# Patient Record
Sex: FEMALE | Race: BLACK OR AFRICAN AMERICAN | Employment: FULL TIME
[De-identification: names, ages, dates, MRNs, and addresses within clinical notes are randomized per-mention and may not be internally consistent; named-entity substitution may affect disease eponyms.]

---

## 2017-01-17 ENCOUNTER — TELEPHONE (OUTPATIENT)
Dept: PULMONOLOGY | Facility: CLINIC | Age: 62
End: 2017-01-17

## 2017-03-07 ENCOUNTER — OFFICE VISIT (OUTPATIENT)
Dept: PULMONOLOGY | Facility: CLINIC | Age: 62
End: 2017-03-07

## 2017-03-07 VITALS
BODY MASS INDEX: 35.93 KG/M2 | DIASTOLIC BLOOD PRESSURE: 89 MMHG | HEART RATE: 64 BPM | WEIGHT: 242.6 LBS | SYSTOLIC BLOOD PRESSURE: 150 MMHG | HEIGHT: 69 IN | RESPIRATION RATE: 16 BRPM | OXYGEN SATURATION: 100 %

## 2017-03-07 DIAGNOSIS — G47.33 OSA (OBSTRUCTIVE SLEEP APNEA): ICD-10-CM

## 2017-03-07 DIAGNOSIS — J45.40 MODERATE PERSISTENT ASTHMA WITHOUT COMPLICATION: ICD-10-CM

## 2017-03-07 DIAGNOSIS — R94.2 ABNORMAL PFTS: ICD-10-CM

## 2017-03-07 DIAGNOSIS — J30.9 ALLERGIC RHINITIS, UNSPECIFIED ALLERGIC RHINITIS TRIGGER, UNSPECIFIED RHINITIS SEASONALITY: ICD-10-CM

## 2017-03-07 DIAGNOSIS — J84.2 LYMPHOCYTIC INTERSTITIAL PNEUMONIA (HCC): Primary | ICD-10-CM

## 2017-03-07 PROCEDURE — 99214 OFFICE O/P EST MOD 30 MIN: CPT | Performed by: INTERNAL MEDICINE

## 2017-03-07 RX ORDER — AMLODIPINE BESYLATE 10 MG/1
10 TABLET ORAL DAILY
COMMUNITY

## 2017-03-07 RX ORDER — ESTRADIOL 0.5 MG/1
0.5 TABLET ORAL WEEKLY
COMMUNITY
End: 2019-06-04

## 2017-03-07 ASSESSMENT — SLEEP AND FATIGUE QUESTIONNAIRES
ESS TOTAL SCORE: 1
HOW LIKELY ARE YOU TO NOD OFF OR FALL ASLEEP WHILE SITTING QUIETLY AFTER LUNCH WITHOUT ALCOHOL: 0
HOW LIKELY ARE YOU TO NOD OFF OR FALL ASLEEP WHEN YOU ARE A PASSENGER IN A CAR FOR AN HOUR WITHOUT A BREAK: 0
HOW LIKELY ARE YOU TO NOD OFF OR FALL ASLEEP WHILE SITTING INACTIVE IN A PUBLIC PLACE: 0
HOW LIKELY ARE YOU TO NOD OFF OR FALL ASLEEP WHILE SITTING AND TALKING TO SOMEONE: 0
HOW LIKELY ARE YOU TO NOD OFF OR FALL ASLEEP WHILE WATCHING TV: 1
HOW LIKELY ARE YOU TO NOD OFF OR FALL ASLEEP WHILE SITTING AND READING: 0
HOW LIKELY ARE YOU TO NOD OFF OR FALL ASLEEP WHILE LYING DOWN TO REST IN THE AFTERNOON WHEN CIRCUMSTANCES PERMIT: 0
HOW LIKELY ARE YOU TO NOD OFF OR FALL ASLEEP IN A CAR, WHILE STOPPED FOR A FEW MINUTES IN TRAFFIC: 0

## 2017-04-25 ENCOUNTER — HOSPITAL ENCOUNTER (OUTPATIENT)
Dept: MAMMOGRAPHY | Age: 62
Discharge: HOME | End: 2017-04-25
Payer: COMMERCIAL

## 2017-04-25 DIAGNOSIS — Z12.31 VISIT FOR SCREENING MAMMOGRAM: ICD-10-CM

## 2017-04-25 PROCEDURE — 77063 BREAST TOMOSYNTHESIS BI: CPT

## 2017-09-12 ENCOUNTER — OFFICE VISIT (OUTPATIENT)
Dept: PULMONOLOGY | Age: 62
End: 2017-09-12
Payer: COMMERCIAL

## 2017-09-12 VITALS — HEIGHT: 69 IN | OXYGEN SATURATION: 99 % | BODY MASS INDEX: 36.14 KG/M2 | HEART RATE: 73 BPM | WEIGHT: 244 LBS

## 2017-09-12 VITALS
DIASTOLIC BLOOD PRESSURE: 93 MMHG | HEART RATE: 70 BPM | OXYGEN SATURATION: 99 % | HEIGHT: 69 IN | RESPIRATION RATE: 14 BRPM | TEMPERATURE: 96.1 F | WEIGHT: 244 LBS | SYSTOLIC BLOOD PRESSURE: 150 MMHG | BODY MASS INDEX: 36.14 KG/M2

## 2017-09-12 DIAGNOSIS — G47.33 OSA (OBSTRUCTIVE SLEEP APNEA): ICD-10-CM

## 2017-09-12 DIAGNOSIS — J84.2 LYMPHOCYTIC INTERSTITIAL PNEUMONIA (HCC): Primary | ICD-10-CM

## 2017-09-12 DIAGNOSIS — R94.2 ABNORMAL PFT: ICD-10-CM

## 2017-09-12 DIAGNOSIS — J45.40 MODERATE PERSISTENT ASTHMA WITHOUT COMPLICATION: ICD-10-CM

## 2017-09-12 DIAGNOSIS — M35.9 CONNECTIVE TISSUE DISEASE, UNDIFFERENTIATED (HCC): ICD-10-CM

## 2017-09-12 DIAGNOSIS — M05.20 RHEUMATOID ARTERITIS (HCC): ICD-10-CM

## 2017-09-12 DIAGNOSIS — J30.89 NON-SEASONAL ALLERGIC RHINITIS, UNSPECIFIED ALLERGIC RHINITIS TRIGGER: ICD-10-CM

## 2017-09-12 DIAGNOSIS — J45.40 MODERATE PERSISTENT ASTHMA WITHOUT COMPLICATION: Primary | ICD-10-CM

## 2017-09-12 DIAGNOSIS — M34.9 SCLERODERMA (HCC): ICD-10-CM

## 2017-09-12 PROCEDURE — 94060 EVALUATION OF WHEEZING: CPT | Performed by: INTERNAL MEDICINE

## 2017-09-12 PROCEDURE — 94729 DIFFUSING CAPACITY: CPT | Performed by: INTERNAL MEDICINE

## 2017-09-12 PROCEDURE — 99215 OFFICE O/P EST HI 40 MIN: CPT | Performed by: INTERNAL MEDICINE

## 2017-09-12 PROCEDURE — 94726 PLETHYSMOGRAPHY LUNG VOLUMES: CPT | Performed by: INTERNAL MEDICINE

## 2017-09-12 ASSESSMENT — SLEEP AND FATIGUE QUESTIONNAIRES
HOW LIKELY ARE YOU TO NOD OFF OR FALL ASLEEP WHILE SITTING QUIETLY AFTER LUNCH WITHOUT ALCOHOL: 0
HOW LIKELY ARE YOU TO NOD OFF OR FALL ASLEEP IN A CAR, WHILE STOPPED FOR A FEW MINUTES IN TRAFFIC: 0
HOW LIKELY ARE YOU TO NOD OFF OR FALL ASLEEP WHILE LYING DOWN TO REST IN THE AFTERNOON WHEN CIRCUMSTANCES PERMIT: 1
HOW LIKELY ARE YOU TO NOD OFF OR FALL ASLEEP WHEN YOU ARE A PASSENGER IN A CAR FOR AN HOUR WITHOUT A BREAK: 0
HOW LIKELY ARE YOU TO NOD OFF OR FALL ASLEEP WHILE SITTING AND TALKING TO SOMEONE: 0
ESS TOTAL SCORE: 1
HOW LIKELY ARE YOU TO NOD OFF OR FALL ASLEEP WHILE SITTING AND READING: 0
HOW LIKELY ARE YOU TO NOD OFF OR FALL ASLEEP WHILE WATCHING TV: 0
HOW LIKELY ARE YOU TO NOD OFF OR FALL ASLEEP WHILE SITTING INACTIVE IN A PUBLIC PLACE: 0

## 2017-10-11 ENCOUNTER — HOSPITAL ENCOUNTER (OUTPATIENT)
Dept: NON INVASIVE DIAGNOSTICS | Age: 62
Discharge: HOME | End: 2017-10-11
Payer: COMMERCIAL

## 2017-10-11 ENCOUNTER — HOSPITAL ENCOUNTER (OUTPATIENT)
Dept: CT IMAGING | Age: 62
Discharge: HOME | End: 2017-10-11
Payer: COMMERCIAL

## 2017-10-11 DIAGNOSIS — J84.2 LYMPHOCYTIC INTERSTITIAL PNEUMONIA (HCC): ICD-10-CM

## 2017-10-11 DIAGNOSIS — R94.2 ABNORMAL PFT: ICD-10-CM

## 2017-10-11 LAB
LV EF: 55 %
LVEF MODALITY: NORMAL

## 2017-10-11 PROCEDURE — 71250 CT THORAX DX C-: CPT

## 2017-10-11 PROCEDURE — 93306 TTE W/DOPPLER COMPLETE: CPT

## 2017-10-24 ENCOUNTER — HOSPITAL ENCOUNTER (OUTPATIENT)
Dept: ULTRASOUND IMAGING | Age: 62
Discharge: HOME | End: 2017-10-24
Payer: COMMERCIAL

## 2017-10-24 ENCOUNTER — HOSPITAL ENCOUNTER (OUTPATIENT)
Dept: MAMMOGRAPHY | Age: 62
Discharge: HOME | End: 2017-10-24
Payer: COMMERCIAL

## 2017-10-24 DIAGNOSIS — R92.8 ABNORMAL MAMMOGRAM: ICD-10-CM

## 2017-10-24 DIAGNOSIS — N63.0 LUMP OR MASS IN BREAST: ICD-10-CM

## 2017-10-24 PROCEDURE — G0279 TOMOSYNTHESIS, MAMMO: HCPCS

## 2017-10-24 PROCEDURE — 76642 ULTRASOUND BREAST LIMITED: CPT

## 2017-12-12 ENCOUNTER — OFFICE VISIT (OUTPATIENT)
Dept: PULMONOLOGY | Age: 62
End: 2017-12-12
Payer: COMMERCIAL

## 2017-12-12 ENCOUNTER — TELEPHONE (OUTPATIENT)
Dept: PULMONOLOGY | Age: 62
End: 2017-12-12

## 2017-12-12 VITALS
RESPIRATION RATE: 12 BRPM | SYSTOLIC BLOOD PRESSURE: 134 MMHG | TEMPERATURE: 97 F | WEIGHT: 239.8 LBS | DIASTOLIC BLOOD PRESSURE: 89 MMHG | OXYGEN SATURATION: 100 % | HEIGHT: 68 IN | HEART RATE: 76 BPM | BODY MASS INDEX: 36.34 KG/M2

## 2017-12-12 DIAGNOSIS — M35.9 CONNECTIVE TISSUE DISEASE, UNDIFFERENTIATED (HCC): ICD-10-CM

## 2017-12-12 DIAGNOSIS — R91.8 LUNG NODULES: ICD-10-CM

## 2017-12-12 DIAGNOSIS — J45.40 MODERATE PERSISTENT ASTHMA WITHOUT COMPLICATION: ICD-10-CM

## 2017-12-12 DIAGNOSIS — J84.2 LYMPHOCYTIC INTERSTITIAL PNEUMONIA (HCC): Primary | ICD-10-CM

## 2017-12-12 DIAGNOSIS — G47.33 OSA (OBSTRUCTIVE SLEEP APNEA): ICD-10-CM

## 2017-12-12 DIAGNOSIS — R94.2 ABNORMAL PFTS: ICD-10-CM

## 2017-12-12 PROCEDURE — 99215 OFFICE O/P EST HI 40 MIN: CPT | Performed by: INTERNAL MEDICINE

## 2017-12-12 RX ORDER — PREDNISONE 1 MG/1
1 TABLET ORAL DAILY
COMMUNITY

## 2017-12-12 NOTE — PROGRESS NOTES
denies symptoms of EDS, and daytime naps and unrefresh sleep    Pt has not been hospitalized or been to er since last visit. Has been using meds as recommended. Subjective:   Review of Systems -   General ROS: negative for - chills, fever, hot flashes, night sweats, weight gain or weight loss  ENT ROS: negative for - epistaxis, nasal congestion, nasal polyps, oral lesions or sneezing  Allergy and Immunology ROS: negative for - nasal congestion, postnasal drip or seasonal allergies  Respiratory ROS: negative for - cough, hemoptysis, orthopnea, sputum changes, stridor, tachypnea or wheezing  Cardiovascular ROS: negative for - chest pain, dyspnea on exertion, edema, loss of consciousness, orthopnea or paroxysmal nocturnal dyspnea  Gastrointestinal ROS: no abdominal pain, change in bowel habits, or black or bloody stools  Musculoskeletal ROS: negative   Genitourinary: Negative for hematuria or dysuria frequency and nocturia. CNS negative for dizziness vertigo weakness numbness tingling. Skin negative for rash and a skin lesion. Hematology oncology negative for easy bleeding and bruising    Sleep Medicine 9/12/2017 3/7/2017   Sitting and reading 0 0   Watching TV 0 1   Sitting, inactive in a public place (e.g. a theatre or a meeting) 0 0   As a passenger in a car for an hour without a break 0 0   Lying down to rest in the afternoon when circumstances permit 1 0   Sitting and talking to someone 0 0   Sitting quietly after a lunch without alcohol 0 0   In a car, while stopped for a few minutes in traffic 0 0   Total score 1 1       Allergies:   Allergies   Allergen Reactions    Nubain [Nalbuphine Hcl]     Penicillins        Medications:  Outpatient Encounter Prescriptions as of 12/12/2017   Medication Sig Dispense Refill    predniSONE (DELTASONE) 1 MG tablet Take 1 mg by mouth daily      fluticasone-salmeterol (ADVAIR DISKUS) 250-50 MCG/DOSE AEPB Inhale 1 puff into the lungs 2 times daily 1 Inhaler 11    estradiol (ESTRACE) 0.5 MG tablet Take 0.5 mg by mouth once a week      amLODIPine (NORVASC) 10 MG tablet Take 10 mg by mouth daily      [DISCONTINUED] fluticasone-salmeterol (ADVAIR) 500-50 MCG/DOSE diskus inhaler Inhale 1 puff into the lungs every 12 hours      nebivolol (BYSTOLIC) 10 MG tablet Take  by mouth daily.  Tramadol-Acetaminophen (ULTRACET PO) Take  by mouth daily as needed. No facility-administered encounter medications on file as of 12/12/2017. Objective:    Physical Exam:  Vitals:   /89 (Site: Right Arm, Position: Sitting)   Pulse 76   Temp 97 °F (36.1 °C)   Resp 12   Ht 5' 8\" (1.727 m)   Wt 239 lb 12.8 oz (108.8 kg)   SpO2 100% Comment: Room air  BMI 36.46 kg/m²   Last 3 weights: Wt Readings from Last 3 Encounters:   12/12/17 239 lb 12.8 oz (108.8 kg)   09/12/17 244 lb (110.7 kg)   09/12/17 244 lb (110.7 kg)     Body mass index is 36.46 kg/m².     Physical Examination:   General appearance - alert, well appearing, and in no distress  Mental status - alert, oriented to person, place, and time  Eyes - pupils equal and reactive, extraocular eye movements intact  Ears - right ear normal, left ear normal  Nose - normal and patent, no erythema, discharge or polyps  Mouth - mucous membranes moist, pharynx normal without lesions   Neck - supple, no significant adenopathy, short and thick neck  Chest - clear to auscultation, no wheezes, rales or rhonchi, symmetric air entry  Heart - normal rate, regular rhythm, normal S1, S2, no murmurs, rubs, clicks or gallops  Abdomen - soft, nontender, nondistended, no masses or organomegaly  Neurological - alert, oriented, normal speech, no focal findings or movement disorder noted}  Extremities - peripheral pulses normal, no pedal edema, no clubbing or cyanosis  Skin - normal coloration and turgor, no rashes, no suspicious skin lesions noted     Labs:  None    CBC:   WBC   Date Value Ref Range Status   06/04/2015 10.0 3.5 - 11.0 k/uL 21. Stable pleural-based posterior left lower lobe nodule measuring 3-4 mm on image 32. Prior cholecystectomy. No significant osseous abnormality with mild hypertrophic    degenerative changes in thoracic spine. EKG:   ECHO:       Assessment:    ICD-10-CM ICD-9-CM    1. Lymphocytic interstitial pneumonia (HCC) J84.2 516.8 FULL PFT STUDY WITH PRE AND POST   2. Moderate persistent asthma without complication L70.69 566.74 FULL PFT STUDY WITH PRE AND POST   3. Abnormal PFTs R94.2 794.2    4. JOJO (obstructive sleep apnea) G47.33 327.23    5. Connective tissue disease, undifferentiated (HCC) M35.9 710.9    6. Lung nodules R91.8 793.19        PFT's reviewed from jan 2016 and sep 2017  and showeddecline in DLCO and TLC and FEV1 and FVC    HRCT Chest seen from October 11, 2017 and also from January 16, 2016 I did not she significant ground glass changes significant fibrotic changes, I did not see septal thickening significant bronchiectatic changes or traction bronchiectasis.   The lung nodules which were present before  is stable without much change, small area of left lung and mid lung to lower lung off inflammatory/ground glass/fibrotic change his pain is stable syringe and very 2016 so overall her vision CTs is stable and there was no worsening of the high-resolution CT finding to explain her decline in her last primary function test and will need follow-up on a function test in about 6 months since she is on prednisone now    Plan:    Follow up Rheumatology and follow up as her worsening PFTs related to ILD from CTD   Prednisone per Rheumatology  Records and office notes from Rheumatology  PFTs next visit  Decrease  Advair 250/50   Albuterol as needed  Vaccinations recommended and Up to date    Maintain an active lifestyle       Encourage CPAP at least 4 hrs qhs  Wt loss is recommended and discussed  Follow good sleep hygeine instructions  Questions answered pertaining to diagnosis and management explained importance of compliance with therapy     RTC in 6 months      Nita Velazquez MD             12/12/2017, 9:51 AM

## 2017-12-12 NOTE — TELEPHONE ENCOUNTER
CALLED DR. Cordelia Sandoval MD OFFICE AND SPOKE WITH EVER SHE WILL FAX OVER THE .S NOTES. Cordelia Sandoval MD.  RHEUMATOLOGY.    [phone] PHONE   [phone] [id number] [facility]  Joey Liriano, [facility]

## 2018-06-12 ENCOUNTER — OFFICE VISIT (OUTPATIENT)
Dept: PULMONOLOGY | Age: 63
End: 2018-06-12
Payer: COMMERCIAL

## 2018-06-12 VITALS
DIASTOLIC BLOOD PRESSURE: 87 MMHG | SYSTOLIC BLOOD PRESSURE: 134 MMHG | HEIGHT: 69 IN | WEIGHT: 247 LBS | TEMPERATURE: 97 F | OXYGEN SATURATION: 96 % | BODY MASS INDEX: 36.58 KG/M2 | RESPIRATION RATE: 16 BRPM | HEART RATE: 76 BPM

## 2018-06-12 VITALS — BODY MASS INDEX: 36.58 KG/M2 | HEART RATE: 53 BPM | HEIGHT: 69 IN | OXYGEN SATURATION: 97 % | WEIGHT: 247 LBS

## 2018-06-12 DIAGNOSIS — G47.33 OBSTRUCTIVE SLEEP APNEA: ICD-10-CM

## 2018-06-12 DIAGNOSIS — J45.40 MODERATE PERSISTENT ASTHMA WITHOUT COMPLICATION: Primary | ICD-10-CM

## 2018-06-12 DIAGNOSIS — M05.20 RHEUMATOID ARTERITIS (HCC): ICD-10-CM

## 2018-06-12 DIAGNOSIS — J84.2 LIP (LYMPHOID INTERSTITIAL PNEUMONITIS) (HCC): Primary | ICD-10-CM

## 2018-06-12 DIAGNOSIS — R94.2 ABNORMAL PFT: ICD-10-CM

## 2018-06-12 DIAGNOSIS — M35.9 CONNECTIVE TISSUE DISEASE, UNDIFFERENTIATED (HCC): ICD-10-CM

## 2018-06-12 DIAGNOSIS — J45.40 MODERATE PERSISTENT ASTHMA WITHOUT COMPLICATION: ICD-10-CM

## 2018-06-12 DIAGNOSIS — R91.1 LUNG NODULE: ICD-10-CM

## 2018-06-12 DIAGNOSIS — M34.9 SCLERODERMA (HCC): ICD-10-CM

## 2018-06-12 DIAGNOSIS — J30.9 CHRONIC ALLERGIC RHINITIS, UNSPECIFIED SEASONALITY, UNSPECIFIED TRIGGER: ICD-10-CM

## 2018-06-12 PROCEDURE — 94729 DIFFUSING CAPACITY: CPT | Performed by: INTERNAL MEDICINE

## 2018-06-12 PROCEDURE — 94726 PLETHYSMOGRAPHY LUNG VOLUMES: CPT | Performed by: INTERNAL MEDICINE

## 2018-06-12 PROCEDURE — 94375 RESPIRATORY FLOW VOLUME LOOP: CPT | Performed by: INTERNAL MEDICINE

## 2018-06-12 PROCEDURE — 99214 OFFICE O/P EST MOD 30 MIN: CPT | Performed by: INTERNAL MEDICINE

## 2018-06-12 RX ORDER — ALBUTEROL SULFATE 90 UG/1
2 AEROSOL, METERED RESPIRATORY (INHALATION) EVERY 6 HOURS PRN
Qty: 1 INHALER | Refills: 11 | Status: SHIPPED | OUTPATIENT
Start: 2018-06-12 | End: 2021-05-12 | Stop reason: SDUPTHER

## 2019-02-05 ENCOUNTER — OFFICE VISIT (OUTPATIENT)
Dept: PULMONOLOGY | Age: 64
End: 2019-02-05
Payer: COMMERCIAL

## 2019-02-05 VITALS
WEIGHT: 253 LBS | HEART RATE: 73 BPM | BODY MASS INDEX: 37.47 KG/M2 | RESPIRATION RATE: 18 BRPM | SYSTOLIC BLOOD PRESSURE: 146 MMHG | DIASTOLIC BLOOD PRESSURE: 90 MMHG | HEIGHT: 69 IN | OXYGEN SATURATION: 97 % | TEMPERATURE: 97.1 F

## 2019-02-05 DIAGNOSIS — J84.2 LYMPHOCYTIC INTERSTITIAL PNEUMONIA (HCC): ICD-10-CM

## 2019-02-05 DIAGNOSIS — G47.33 OBSTRUCTIVE SLEEP APNEA: ICD-10-CM

## 2019-02-05 DIAGNOSIS — R91.1 LUNG NODULE: ICD-10-CM

## 2019-02-05 DIAGNOSIS — J45.40 MODERATE PERSISTENT ASTHMA WITHOUT COMPLICATION: Primary | ICD-10-CM

## 2019-02-05 PROCEDURE — 99214 OFFICE O/P EST MOD 30 MIN: CPT | Performed by: INTERNAL MEDICINE

## 2019-06-04 ENCOUNTER — OFFICE VISIT (OUTPATIENT)
Dept: PULMONOLOGY | Age: 64
End: 2019-06-04
Payer: COMMERCIAL

## 2019-06-04 VITALS
HEART RATE: 69 BPM | WEIGHT: 254.8 LBS | BODY MASS INDEX: 37.74 KG/M2 | SYSTOLIC BLOOD PRESSURE: 129 MMHG | RESPIRATION RATE: 12 BRPM | OXYGEN SATURATION: 99 % | DIASTOLIC BLOOD PRESSURE: 73 MMHG | HEIGHT: 69 IN

## 2019-06-04 DIAGNOSIS — G47.33 OBSTRUCTIVE SLEEP APNEA: ICD-10-CM

## 2019-06-04 DIAGNOSIS — R05.3 CHRONIC COUGH: ICD-10-CM

## 2019-06-04 DIAGNOSIS — J45.40 MODERATE PERSISTENT ASTHMA WITHOUT COMPLICATION: Primary | ICD-10-CM

## 2019-06-04 DIAGNOSIS — J30.9 ALLERGIC RHINITIS, UNSPECIFIED SEASONALITY, UNSPECIFIED TRIGGER: ICD-10-CM

## 2019-06-04 DIAGNOSIS — J84.2 LYMPHOCYTIC INTERSTITIAL PNEUMONIA (HCC): ICD-10-CM

## 2019-06-04 PROCEDURE — 99214 OFFICE O/P EST MOD 30 MIN: CPT | Performed by: INTERNAL MEDICINE

## 2019-06-04 NOTE — PROGRESS NOTES
PULMONARY OP  PROGRESS NOTE      Patient:  Praneeth Gusman  YOB: 1955    MRN: [id number]     Acct:        Pt seen and Chart reviewed. Mr/Ms Praneeth Gusman is here in followup for    Diagnosis Orders   1. Moderate persistent asthma without complication     2. Allergic rhinitis, unspecified seasonality, unspecified trigger     3. Lymphocytic interstitial pneumonia ([facility] [address]     4. Obstructive sleep apnea       She is here for follow-up of asthma, dyspnea and history of lymphocytic interstitial pneumonia. Since she was seen last time she was started on immnnothrapy Baricitinib on prednisone 1 mg daily by rheumatology Dr Nanette Lara for rheumatoid arthritis  She has history of noncompliance with CPAP and has not been using CPAP. She denies any increase in cough, she claimed that she has mild intermittent chronic cough no sputum production and no wheezing, denies daily persistent cough and denies nocturnal cough. She denies skin rash, she is complaining of swelling in right sternoclavicular joint for a few weeks without redness fever fluctuation. No change in voice, no hoarseness or difficulty swallowing and no stridor. She denies shortness of breath and she is able to do her regular activities without getting shortness of breath. He denies taking albuterol and use of albuterol is only occasionally. She is taking Advair 250/50 twice daily   She denies chronic postnasal dripping and nasal congestion or epistaxis, she is using Zyrtec and Flonase as needed. Her reflux symptoms are controlled on PPI. Since she was seen last time she had no history of exacerbation no ER visit or hospitalization for asthma. She has history of abnormal PFTs with a restrictive pattern and decrease DLCO for many years, she had a high-resolution CT of the chest done in 2017 which did not show areas of significant fibrosis or ground glass changes at that time.       Previous history/previous course  She has h/o Unspecified CTD/RA, ILD which was diagnosed to be LIP on open lung biopsy done many years ago and mild reduction in lung volumes and dlco and has stable PFTs and stable symptoms until last visit when there was decline in FEV1 and FVC, lung volumes and DLCO and she was scheduled  for HRCT Chest and ECHO, her high-resolution CT of the chest last time did not show active alveolitis, groundglass changes or significant fibrotic changes, her echo shows normal LV function but was difficult to estimated RV systolic pressure which shows normal RV size and function and she was instructed that she needs Rheumatology     In the past she was on prednisone then plaquenil and then Ariva and then she was stopped by her rheumatology and she was not seen by Dr Hien Chapin for some time. She has h/o JOJO diagnosed severe JOJO in 2005 but she is not using cpap for some times now and still non compliant with cpap and denies symptoms of EDS, and daytime naps and unrefresh sleep        Subjective:   Review of Systems -   General ROS: negative for - chills, fever, hot flashes, night sweats, weight gain or weight loss  ENT ROS: Negative for nasal congestion postnasal dripping, negative for - epistaxis, nasal congestion, nasal polyps, oral lesions or sneezing  Allergy and Immunology ROS: Positive for - nasal congestion, postnasal drip or seasonal allergies  Respiratory ROS: Negative for - cough, negative for hemoptysis, orthopnea, sputum changes, stridor, tachypnea or wheezing  Cardiovascular ROS: negative for - chest pain, dyspnea on exertion, edema, loss of consciousness, orthopnea or paroxysmal nocturnal dyspnea  Gastrointestinal ROS: no abdominal pain, change in bowel habits, or black or bloody stools  Musculoskeletal ROS: Positive for joints pain and stiffness, negative for joint swelling   Genitourinary: Negative for hematuria or dysuria frequency and nocturia.   CNS negative for dizziness vertigo weakness numbness tingling, syncope, headache, seizure. Skin negative for rash and a skin lesion. Hematology oncology negative for easy bleeding and bruising and petechia    Sleep Medicine 9/12/2017 3/7/2017   Sitting and reading 0 0   Watching TV 0 1   Sitting, inactive in a public place (e.g. a theatre or a meeting) 0 0   As a passenger in a car for an hour without a break 0 0   Lying down to rest in the afternoon when circumstances permit 1 0   Sitting and talking to someone 0 0   Sitting quietly after a lunch without alcohol 0 0   In a car, while stopped for a few minutes in traffic 0 0   Total score 1 1       Allergies: Allergies   Allergen Reactions    Nubain [Nalbuphine Hcl]     Penicillins        Medications:  Outpatient Encounter Medications as of 6/4/2019   Medication Sig Dispense Refill    Baricitinib (OLUMIANT) 2 MG TABS Take 2 mg by mouth daily      fluticasone-salmeterol (ADVAIR DISKUS) 250-50 MCG/DOSE AEPB Inhale 1 puff into the lungs 2 times daily 1 Inhaler 11    albuterol sulfate HFA (VENTOLIN HFA) 108 (90 Base) MCG/ACT inhaler Inhale 2 puffs into the lungs every 6 hours as needed for Wheezing 1 Inhaler 11    predniSONE (DELTASONE) 1 MG tablet Take 1 mg by mouth daily      amLODIPine (NORVASC) 10 MG tablet Take 10 mg by mouth daily      nebivolol (BYSTOLIC) 10 MG tablet Take  by mouth daily.  Tramadol-Acetaminophen (ULTRACET PO) Take  by mouth daily as needed.  [DISCONTINUED] estradiol (ESTRACE) 0.5 MG tablet Take 0.5 mg by mouth once a week       No facility-administered encounter medications on file as of 6/4/2019. Objective:    Physical Exam:  Vitals:   /73 (Site: Left Upper Arm, Position: Sitting, Cuff Size: Small Adult)   Pulse 69   Resp 12   Ht 5' 9\" (1.753 m)   Wt 254 lb 12.8 oz (115.6 kg)   SpO2 99% Comment: Room air at rest  Breastfeeding? No   BMI 37.63 kg/m²   Last 3 weights:    Wt Readings from Last 3 Encounters:   06/04/19 254 lb 12.8 oz (115.6 kg)   02/05/19 253 lb (114.8 kg)   06/12/18 247 lb (112 kg)     Body mass index is 37.63 kg/m².     Physical Examination:   General appearance - alert, well appearing, and in no distress  Mental status - alert, oriented to person, place, and time  Eyes - pupils equal and reactive, extraocular eye movements intact  Ears - right ear normal, left ear normal  Nose - normal and patent, no erythema, discharge or polyps  Mouth - mucous membranes moist, pharynx normal without lesions   Neck - supple, no significant adenopathy, short and thick neck  Chest - clear to auscultation, no wheezes, rales or rhonchi, symmetric air entry  Heart - normal rate, regular rhythm, normal S1, S2, no murmurs, rubs, clicks or gallops  Abdomen - soft, nontender, nondistended, no masses or organomegaly  Neurological - alert, oriented, normal speech, no focal findings or movement disorder noted  Extremities - peripheral pulses normal, no pedal edema, no clubbing or cyanosis  Skin - normal coloration and turgor, no rashes, no suspicious skin lesions noted     Labs:  None    CBC:   WBC   Date Value Ref Range Status   06/04/2015 10.0 3.5 - 11.0 k/uL Final     Hemoglobin   Date Value Ref Range Status   06/04/2015 14.1 12.0 - 16.0 g/dL Final     Platelets   Date Value Ref Range Status   06/04/2015 251 140 - 450 k/uL Final     BMP:   Sodium   Date Value Ref Range Status   06/04/2015 144 135 - 144 mmol/L Final     Potassium   Date Value Ref Range Status   06/04/2015 4.5 3.7 - 5.3 mmol/L Final     Chloride   Date Value Ref Range Status   06/04/2015 103 98 - 107 mmol/L Final     CO2   Date Value Ref Range Status   06/04/2015 25 20 - 31 mmol/L Final     BUN   Date Value Ref Range Status   06/04/2015 11 6 - 20 mg/dL Final     CREATININE   Date Value Ref Range Status   06/04/2015 0.50 0.50 - 0.90 mg/dL Final   05/03/2014 0.48 (L) 0.50 - 0.90 mg/dL Final     Glucose   Date Value Ref Range Status   06/04/2015 75 70 - 99 mg/dL Final     S. Calcium:   Calcium   Date Value Ref

## 2019-06-17 ENCOUNTER — HOSPITAL ENCOUNTER (OUTPATIENT)
Dept: MAMMOGRAPHY | Age: 64
Discharge: HOME | End: 2019-06-19
Payer: COMMERCIAL

## 2019-06-17 ENCOUNTER — HOSPITAL ENCOUNTER (OUTPATIENT)
Dept: ULTRASOUND IMAGING | Age: 64
Discharge: HOME | End: 2019-06-19
Payer: COMMERCIAL

## 2019-06-17 DIAGNOSIS — R92.8 ABNORMAL MAMMOGRAM: ICD-10-CM

## 2019-06-17 DIAGNOSIS — Z12.31 VISIT FOR SCREENING MAMMOGRAM: ICD-10-CM

## 2019-06-17 PROCEDURE — 76642 ULTRASOUND BREAST LIMITED: CPT

## 2019-06-17 PROCEDURE — G0279 TOMOSYNTHESIS, MAMMO: HCPCS

## 2020-01-03 ENCOUNTER — HOSPITAL ENCOUNTER (OUTPATIENT)
Dept: GENERAL RADIOLOGY | Age: 65
Discharge: HOME | End: 2020-01-05
Payer: COMMERCIAL

## 2020-01-03 ENCOUNTER — HOSPITAL ENCOUNTER (OUTPATIENT)
Age: 65
Discharge: HOME | End: 2020-01-05
Payer: COMMERCIAL

## 2020-01-03 PROCEDURE — 71046 X-RAY EXAM CHEST 2 VIEWS: CPT

## 2020-01-14 ENCOUNTER — OFFICE VISIT (OUTPATIENT)
Dept: PULMONOLOGY | Age: 65
End: 2020-01-14
Payer: COMMERCIAL

## 2020-01-14 VITALS
HEART RATE: 69 BPM | BODY MASS INDEX: 37.5 KG/M2 | OXYGEN SATURATION: 99 % | WEIGHT: 253.2 LBS | DIASTOLIC BLOOD PRESSURE: 79 MMHG | RESPIRATION RATE: 12 BRPM | SYSTOLIC BLOOD PRESSURE: 140 MMHG | HEIGHT: 69 IN

## 2020-01-14 PROCEDURE — 99214 OFFICE O/P EST MOD 30 MIN: CPT | Performed by: INTERNAL MEDICINE

## 2020-01-14 NOTE — PROGRESS NOTES
dizziness vertigo weakness numbness tingling, syncope, headache, seizure. Skin negative for rash and a skin lesion. Hematology oncology negative for easy bleeding and bruising and petechia    Sleep Medicine 9/12/2017 3/7/2017   Sitting and reading 0 0   Watching TV 0 1   Sitting, inactive in a public place (e.g. a theatre or a meeting) 0 0   As a passenger in a car for an hour without a break 0 0   Lying down to rest in the afternoon when circumstances permit 1 0   Sitting and talking to someone 0 0   Sitting quietly after a lunch without alcohol 0 0   In a car, while stopped for a few minutes in traffic 0 0   Total score 1 1       Allergies: Allergies   Allergen Reactions    Nubain [Nalbuphine Hcl]     Penicillins        Medications:  Outpatient Encounter Medications as of 1/14/2020   Medication Sig Dispense Refill    Tofacitinib Citrate (XELJANZ PO) Take by mouth daily      Baricitinib (OLUMIANT) 2 MG TABS Take 2 mg by mouth daily      albuterol sulfate HFA (VENTOLIN HFA) 108 (90 Base) MCG/ACT inhaler Inhale 2 puffs into the lungs every 6 hours as needed for Wheezing 1 Inhaler 11    predniSONE (DELTASONE) 1 MG tablet Take 1 mg by mouth daily      amLODIPine (NORVASC) 10 MG tablet Take 10 mg by mouth daily      nebivolol (BYSTOLIC) 10 MG tablet Take  by mouth daily.  Tramadol-Acetaminophen (ULTRACET PO) Take  by mouth daily as needed.  fluticasone-salmeterol (ADVAIR DISKUS) 250-50 MCG/DOSE AEPB Inhale 1 puff into the lungs 2 times daily 1 Inhaler 11     No facility-administered encounter medications on file as of 1/14/2020. Objective:    Physical Exam:  Vitals:   BP (!) 140/79 (Site: Right Lower Arm)   Pulse 69   Resp 12   Ht 5' 9\" (1.753 m)   Wt 253 lb 3.2 oz (114.9 kg)   SpO2 99% Comment: Room air at rest  BMI 37.39 kg/m²   Last 3 weights:    Wt Readings from Last 3 Encounters:   01/14/20 253 lb 3.2 oz (114.9 kg)   06/04/19 254 lb 12.8 oz (115.6 kg)   02/05/19 253 lb (114.8 kg) Body mass index is 37.39 kg/m².     Physical Examination:   General appearance - alert, well appearing, and in no distress  Mental status - alert, oriented to person, place, and time  Eyes - pupils equal and reactive, extraocular eye movements intact  Ears - right ear normal, left ear normal  Nose - normal and patent, no erythema, discharge or polyps  Mouth - mucous membranes moist, pharynx normal without lesions   Neck - supple, no significant adenopathy, short and thick neck  Chest - clear to auscultation, no wheezes, rales or rhonchi, symmetric air entry  Heart - normal rate, regular rhythm, normal S1, S2, no murmurs, rubs, clicks or gallops  Abdomen - soft, nontender, nondistended, no masses or organomegaly  Neurological - alert, oriented, normal speech, no focal findings or movement disorder noted  Extremities - peripheral pulses normal, no pedal edema, no clubbing or cyanosis  Skin - normal coloration and turgor, no rashes, no suspicious skin lesions noted     Labs:  None    CBC:   WBC   Date Value Ref Range Status   06/04/2015 10.0 3.5 - 11.0 k/uL Final     Hemoglobin   Date Value Ref Range Status   06/04/2015 14.1 12.0 - 16.0 g/dL Final     Platelets   Date Value Ref Range Status   06/04/2015 251 140 - 450 k/uL Final     BMP:   Sodium   Date Value Ref Range Status   06/04/2015 144 135 - 144 mmol/L Final     Potassium   Date Value Ref Range Status   06/04/2015 4.5 3.7 - 5.3 mmol/L Final     Chloride   Date Value Ref Range Status   06/04/2015 103 98 - 107 mmol/L Final     CO2   Date Value Ref Range Status   06/04/2015 25 20 - 31 mmol/L Final     BUN   Date Value Ref Range Status   06/04/2015 11 6 - 20 mg/dL Final     CREATININE   Date Value Ref Range Status   06/04/2015 0.50 0.50 - 0.90 mg/dL Final   05/03/2014 0.48 (L) 0.50 - 0.90 mg/dL Final     Glucose   Date Value Ref Range Status   06/04/2015 75 70 - 99 mg/dL Final     S. Calcium:   Calcium   Date Value Ref Range Status   06/04/2015 9.1 8.6 - 10.4 mg/dL Final     S. Ionized Calcium: No results found for: IONCA  S. Magnesium: No results found for: MG  S. Phosphorus: No results found for: PHOS  S. Glucose: No results found for: POCGLU  Glycosylated hemoglobin A1C: No results found for: LABA1C    Pulmonary Functions Testing Results:    6/12/18: FEV1 1.75 69%, FVC 2.08 65%, FEV1/%, TLC 3.29 63%, DLCO 11.27 51%  9/12/17: FEV1 1.71 57%, FVC 2.08 54%, [id number] 105%, TLC 3.24 53%, DLCO 11.46 41%  1/19/16: FEV1 1.86 79%, FVC 2.27 72%, [id number] 110%, TLC 3.37 67%, DLCO 12.82 62%     Blood Gases: No results found for: PH, PCO2, PO2, HCO3, O2SAT    Radiological reports:    CXR      CT Scans    HRCT Chest     10/11/17  Stable appearing HRCT with no acute process and redemonstration of few   nonspecific 3-4 mm pulmonary nodules as well as left lung scarring. 1/16/16  No significant ground glass opacities, intralobular septal thickening/interstitial opacities, centrilobular nodules, emphysematous changes, bronchiectasis, fibrosis/honeycombing.  No pleural effusion. Stable areas of scarring are again noted in    the left lung. Stable 3 mm peripheral right upper lobe nodule on series 4 image 21. Stable pleural-based posterior left lower lobe nodule measuring 3-4 mm on image 32. Prior cholecystectomy. No significant osseous abnormality with mild hypertrophic    degenerative changes in thoracic spine. EKG:   ECHO: 10/11/2017  Technically difficult study due to patients body habitus. Left ventricle is normal in size. Global left ventricular systolic function  is normal. Estimated ejection fraction is 55 % . Difficult to estimate right ventricular systolic pressure due to body  habitus. Normal right ventricular size and function. No significant pericardial effusion is seen. Assessment:    ICD-10-CM    1. Moderate persistent asthma without complication R07.15    2. Allergic rhinitis, unspecified seasonality, unspecified trigger J30.9    3.  Obstructive sleep

## 2020-03-18 ENCOUNTER — OFFICE VISIT (OUTPATIENT)
Dept: PRIMARY CARE CLINIC | Age: 65
End: 2020-03-18
Payer: COMMERCIAL

## 2020-03-18 VITALS
HEIGHT: 69 IN | SYSTOLIC BLOOD PRESSURE: 131 MMHG | OXYGEN SATURATION: 99 % | BODY MASS INDEX: 37.77 KG/M2 | WEIGHT: 255 LBS | TEMPERATURE: 97.1 F | DIASTOLIC BLOOD PRESSURE: 84 MMHG | HEART RATE: 82 BPM

## 2020-03-18 LAB
INFLUENZA A ANTIBODY: NEGATIVE
INFLUENZA B ANTIBODY: NEGATIVE
S PYO AG THROAT QL: NORMAL

## 2020-03-18 PROCEDURE — 99203 OFFICE O/P NEW LOW 30 MIN: CPT | Performed by: NURSE PRACTITIONER

## 2020-03-18 PROCEDURE — 87804 INFLUENZA ASSAY W/OPTIC: CPT | Performed by: NURSE PRACTITIONER

## 2020-03-18 PROCEDURE — 87880 STREP A ASSAY W/OPTIC: CPT | Performed by: NURSE PRACTITIONER

## 2020-03-18 RX ORDER — DOXYCYCLINE HYCLATE 100 MG/1
100 CAPSULE ORAL 2 TIMES DAILY
Qty: 20 CAPSULE | Refills: 0 | Status: SHIPPED | OUTPATIENT
Start: 2020-03-18 | End: 2020-03-28

## 2020-03-18 ASSESSMENT — ENCOUNTER SYMPTOMS
COUGH: 1
SORE THROAT: 1
SINUS PRESSURE: 1
VOICE CHANGE: 0
EYE DISCHARGE: 0
EYE REDNESS: 0
SHORTNESS OF BREATH: 0
CHEST TIGHTNESS: 0
WHEEZING: 0
SINUS COMPLAINT: 1

## 2020-03-18 NOTE — PATIENT INSTRUCTIONS
Patient Education        Sinusitis: Care Instructions  Your Care Instructions    Sinusitis is an infection of the lining of the sinus cavities in your head. Sinusitis often follows a cold. It causes pain and pressure in your head and face. In most cases, sinusitis gets better on its own in 1 to 2 weeks. But some mild symptoms may last for several weeks. Sometimes antibiotics are needed. Follow-up care is a key part of your treatment and safety. Be sure to make and go to all appointments, and call your doctor if you are having problems. It's also a good idea to know your test results and keep a list of the medicines you take. How can you care for yourself at home? · Take an over-the-counter pain medicine, such as acetaminophen (Tylenol), ibuprofen (Advil, Motrin), or naproxen (Aleve). Read and follow all instructions on the label. · If the doctor prescribed antibiotics, take them as directed. Do not stop taking them just because you feel better. You need to take the full course of antibiotics. · Be careful when taking over-the-counter cold or flu medicines and Tylenol at the same time. Many of these medicines have acetaminophen, which is Tylenol. Read the labels to make sure that you are not taking more than the recommended dose. Too much acetaminophen (Tylenol) can be harmful. · Breathe warm, moist air from a steamy shower, a hot bath, or a sink filled with hot water. Avoid cold, dry air. Using a humidifier in your home may help. Follow the directions for cleaning the machine. · Use saline (saltwater) nasal washes to help keep your nasal passages open and wash out mucus and bacteria. You can buy saline nose drops at a grocery store or drugstore. Or you can make your own at home by adding 1 teaspoon of salt and 1 teaspoon of baking soda to 2 cups of distilled water. If you make your own, fill a bulb syringe with the solution, insert the tip into your nostril, and squeeze gently. Ronda Camilla your nose.   · Put a hot, wet towel or a warm gel pack on your face 3 or 4 times a day for 5 to 10 minutes each time. · Try a decongestant nasal spray like oxymetazoline (Afrin). Do not use it for more than 3 days in a row. Using it for more than 3 days can make your congestion worse. When should you call for help? Call your doctor now or seek immediate medical care if:    · You have new or worse swelling or redness in your face or around your eyes.     · You have a new or higher fever.    Watch closely for changes in your health, and be sure to contact your doctor if:    · You have new or worse facial pain.     · The mucus from your nose becomes thicker (like pus) or has new blood in it.     · You are not getting better as expected. Where can you learn more? Go to [email or web address] org and sign in to your [employer] account. Enter E430 in the [facility] box to learn more about \"Sinusitis: Care Instructions. \"     If you do not have an account, please click on the \"Sign Up Now\" link. Current as of: July 28, 2019Content Version: 12.4  © 7168-7083 [employer]. Care instructions adapted under license by [facility] ([facility]). If you have questions about a medical condition or this instruction, always ask your healthcare professional. [address] any warranty or liability for your use of this information.

## 2020-03-18 NOTE — PROGRESS NOTES
Left eye: No discharge. Cardiovascular:      Rate and Rhythm: Normal rate and regular rhythm. Heart sounds: Normal heart sounds. No murmur. Pulmonary:      Effort: Pulmonary effort is normal. No respiratory distress. Breath sounds: Normal breath sounds. No wheezing or rales. Lymphadenopathy:      Cervical: No cervical adenopathy. Skin:     General: Skin is warm. Findings: No rash. Neurological:      Mental Status: She is alert. /84 (Site: Left Upper Arm, Position: Sitting, Cuff Size: Large Adult)   Pulse 82   Temp 97.1 °F (36.2 °C) (Temporal)   Ht 5' 9\" (1.753 m)   Wt 255 lb (115.7 kg)   SpO2 99%   BMI 37.66 kg/m²     Results for orders placed or performed in visit on 03/18/20   POCT rapid strep A   Result Value Ref Range    Strep A Ag None Detected None Detected   POCT Influenza A/B   Result Value Ref Range    Influenza A Ab Negative     Influenza B Ab Negative      Assessment:       Diagnosis Orders   1. Acute bacterial sinusitis     2. Fever, unspecified fever cause  POCT rapid strep A    POCT Influenza A/B     Plan:      Based on the duration and severity of the symptoms-- I will treat this as bacterial at this time. Patient instructed to complete antibiotic prescription fully. May use Motrin/Tylenol for fever/pain. Saline washes, salt water gargles and over the counter preparations if desired. Patient agreeable to treatment plan. Educational materials provided on AVS.  Follow up if symptoms do not improve/worsen. Orders Placed This Encounter   Medications    doxycycline hyclate (VIBRAMYCIN) 100 MG capsule     Sig: Take 1 capsule by mouth 2 times daily for 10 days     Dispense:  20 capsule     Refill:  0        Patient given educational materials - see patient instructions. Discussed use, benefit, and side effects of prescribed medications. All patientquestions answered. Pt voiced understanding.     Electronically signed by Josue Massey, AVERY - CNP on 3/18/2020at 9:18 AM

## 2020-07-17 ENCOUNTER — TELEMEDICINE (OUTPATIENT)
Dept: PULMONOLOGY | Age: 65
End: 2020-07-17
Payer: COMMERCIAL

## 2020-07-17 PROCEDURE — 99214 OFFICE O/P EST MOD 30 MIN: CPT | Performed by: INTERNAL MEDICINE

## 2020-07-17 NOTE — PROGRESS NOTES
negative for - chest pain, dyspnea on exertion, edema, loss of consciousness, orthopnea or paroxysmal nocturnal dyspnea  Gastrointestinal ROS: no abdominal pain, change in bowel habits, or black or bloody stools  Musculoskeletal ROS: Positive for joints pain and stiffness, negative for joint swelling   Genitourinary: Negative for hematuria or dysuria frequency and nocturia. CNS negative for dizziness vertigo weakness numbness tingling, syncope, headache, seizure. Skin negative for rash and a skin lesion. Hematology oncology negative for easy bleeding and bruising and petechia    Sleep Medicine 9/12/2017 3/7/2017   Sitting and reading 0 0   Watching TV 0 1   Sitting, inactive in a public place (e.g. a theatre or a meeting) 0 0   As a passenger in a car for an hour without a break 0 0   Lying down to rest in the afternoon when circumstances permit 1 0   Sitting and talking to someone 0 0   Sitting quietly after a lunch without alcohol 0 0   In a car, while stopped for a few minutes in traffic 0 0   Total score 1 1       Allergies: Allergies   Allergen Reactions    Nubain [Nalbuphine Hcl]     Penicillins        Medications:  Outpatient Encounter Medications as of 7/17/2020   Medication Sig Dispense Refill    Tofacitinib Citrate (XELJANZ PO) Take by mouth daily      Baricitinib (OLUMIANT) 2 MG TABS Take 2 mg by mouth daily      albuterol sulfate HFA (VENTOLIN HFA) 108 (90 Base) MCG/ACT inhaler Inhale 2 puffs into the lungs every 6 hours as needed for Wheezing 1 Inhaler 11    predniSONE (DELTASONE) 1 MG tablet Take 1 mg by mouth daily      amLODIPine (NORVASC) 10 MG tablet Take 10 mg by mouth daily      nebivolol (BYSTOLIC) 10 MG tablet Take  by mouth daily.  Tramadol-Acetaminophen (ULTRACET PO) Take  by mouth daily as needed.       fluticasone-salmeterol (ADVAIR DISKUS) 250-50 MCG/DOSE AEPB Inhale 1 puff into the lungs 2 times daily 1 Inhaler 11     No facility-administered encounter medications on file as of 7/17/2020. Objective:    Physical Exam:  Vitals: There were no vitals taken for this visit. Last 3 weights: Wt Readings from Last 3 Encounters:   03/18/20 255 lb (115.7 kg)   01/14/20 253 lb 3.2 oz (114.9 kg)   06/04/19 254 lb 12.8 oz (115.6 kg)     There is no height or weight on file to calculate BMI. Physical Examination:   General appearance - alert, well appearing, and in no distress  Mental status - alert, oriented to person, place, and time  Eyes - Extraocular eye movements intact  Ears - right ear normal, left ear normal  Nose -not examined   mouth -not examined  Neck - Short and thick neck  Chest -no tachypnea, no retraction and no distress on visual exam  Heart -not examined   Abdomen -not examined  Neurological - alert, oriented, normal speech, no focal findings or movement disorder noted  Extremities -not examined  Skin -not examined    Labs:  None    CBC:   WBC   Date Value Ref Range Status   06/04/2015 10.0 3.5 - 11.0 k/uL Final     Hemoglobin   Date Value Ref Range Status   06/04/2015 14.1 12.0 - 16.0 g/dL Final     Platelets   Date Value Ref Range Status   06/04/2015 251 140 - 450 k/uL Final     BMP:   Sodium   Date Value Ref Range Status   06/04/2015 144 135 - 144 mmol/L Final     Potassium   Date Value Ref Range Status   06/04/2015 4.5 3.7 - 5.3 mmol/L Final     Chloride   Date Value Ref Range Status   06/04/2015 103 98 - 107 mmol/L Final     CO2   Date Value Ref Range Status   06/04/2015 25 20 - 31 mmol/L Final     BUN   Date Value Ref Range Status   06/04/2015 11 6 - 20 mg/dL Final     CREATININE   Date Value Ref Range Status   06/04/2015 0.50 0.50 - 0.90 mg/dL Final   05/03/2014 0.48 (L) 0.50 - 0.90 mg/dL Final     Glucose   Date Value Ref Range Status   06/04/2015 75 70 - 99 mg/dL Final     S. Calcium:   Calcium   Date Value Ref Range Status   06/04/2015 9.1 8.6 - 10.4 mg/dL Final     S.  Ionized Calcium: No results found for: IONCA  S. Magnesium: No results found for: MG  S. Phosphorus: No results found for: PHOS  S. Glucose: No results found for: POCGLU  Glycosylated hemoglobin A1C: No results found for: LABA1C    Pulmonary Functions Testing Results:    6/12/18: FEV1 1.75 69%, FVC 2.08 65%, FEV1/%, TLC 3.29 63%, DLCO 11.27 51%  9/12/17: FEV1 1.71 57%, FVC 2.08 54%, [id number] 105%, TLC 3.24 53%, DLCO 11.46 41%  1/19/16: FEV1 1.86 79%, FVC 2.27 72%, [id number] 110%, TLC 3.37 67%, DLCO 12.82 62%     Blood Gases: No results found for: PH, PCO2, PO2, HCO3, O2SAT    Radiological reports:    CXR  01/03/2020  The mediastinal and cardiac contours are normal.  There is no focal    consolidation, pleural effusion or pneumothorax.  Scarring in the left upper    lobe is unchanged. CT Scans    HRCT Chest     10/11/17  Stable appearing HRCT with no acute process and redemonstration of few   nonspecific 3-4 mm pulmonary nodules as well as left lung scarring. 1/16/16  No significant ground glass opacities, intralobular septal thickening/interstitial opacities, centrilobular nodules, emphysematous changes, bronchiectasis, fibrosis/honeycombing.  No pleural effusion. Stable areas of scarring are again noted in    the left lung. Stable 3 mm peripheral right upper lobe nodule on series 4 image 21. Stable pleural-based posterior left lower lobe nodule measuring 3-4 mm on image 32. Prior cholecystectomy. No significant osseous abnormality with mild hypertrophic    degenerative changes in thoracic spine. EKG:   ECHO: 10/11/2017  Technically difficult study due to patients body habitus. Left ventricle is normal in size. Global left ventricular systolic function  is normal. Estimated ejection fraction is 55 % . Difficult to estimate right ventricular systolic pressure due to body  habitus. Normal right ventricular size and function. No significant pericardial effusion is seen. Assessment:    ICD-10-CM    1. Moderate persistent asthma without complication  L48.92    2. Obstructive sleep apnea  G47.33    3. Lymphocytic interstitial pneumonia ([facility] [address]  J84.2        PFT's  from jan 2016 and sep 2017 and June 2018 showed decline in DLCO and TLC and FEV1 and FVC, as compared to 2016 but is stable from September 2017. HRCT Chest from October 11, 2017 and also from January 16, 2016 did not show significant ground glass changes significant fibrotic changes, no significant septal thickening  bronchiectatic changes or traction bronchiectasis. Chest x-ray from 01/03/2020 and no acute or chronic changes seen mild area of platelike atelectasis in left upper lung field is the same as it was before not much change in chest x-ray from 2015. Plan:      Follow up Rheumatology and prednisone per rheumatology  Continue  Advair 250/50   Advised to avoid allergens and triggers   Albuterol as needed  Flonase daily and Zyrtec to continue as needed  Vaccinations recommended and she does not want vaccine    Recommend flu vaccine annually in fall  Recommend Pneumonia vaccine  Maintain an active lifestyle     Encourage CPAP use, discussed with her again today and she does not want to try CPAP again. Wt loss is recommended and discussed  Follow good sleep hygeine instructions  Questions answered pertaining to diagnosis and management explained importance of compliance with therapy. RTC in 6 months    Please note that this chart was generated using voice recognition Dragon dictation software. Although every effort was made to ensure the accuracy of this automated transcription, some errors in transcription may have occurred. Silvia Guzman MD             7/17/2020, 3:21 PM     Ashu Stephen is a 63 y.o. female being evaluated by a Virtual Visit (video/telephone visit) encounter to address concerns as mentioned above. A caregiver was present when appropriate.  Due to this being a TeleHealth encounter (During YIR-80 public health emergency), evaluation of the following organ systems was limited: Vitals/Constitutional/EENT/Resp/CV/GI//MS/Neuro/Skin/Heme-Lymph-Imm. Pursuant to the emergency declaration under the [facility], [facility] waiver authority and the [employer] Act, this Virtual Visit was conducted with patient's  consent, to reduce the patient's risk of exposure to COVID-19 and provide necessary medical care. The patient has also been advised to contact this office for worsening conditions or problems, and seek emergency medical treatment and/or call 911 if deemed necessary. Patient identification was verified at the start of the visit: Yes    Total time spent for this encounter: 25 minutes    Services were provided through a video/telephone synchronous discussion virtually to substitute for in-person clinic visit. Patient and provider were located at their individual locations at home and office respectively. --Marvin Ge MD on 7/17/2020 at 3:26 PM    An electronic signature was used to authenticate this note.

## 2020-12-17 ENCOUNTER — HOSPITAL ENCOUNTER (OUTPATIENT)
Dept: MAMMOGRAPHY | Age: 65
Discharge: HOME | End: 2020-12-19
Payer: COMMERCIAL

## 2020-12-17 PROCEDURE — 77080 DXA BONE DENSITY AXIAL: CPT

## 2020-12-17 PROCEDURE — 77063 BREAST TOMOSYNTHESIS BI: CPT

## 2021-03-09 ENCOUNTER — VIRTUAL VISIT (OUTPATIENT)
Dept: PULMONOLOGY | Age: 66
End: 2021-03-09
Payer: COMMERCIAL

## 2021-03-09 DIAGNOSIS — J84.2 LIP (LYMPHOID INTERSTITIAL PNEUMONITIS) (HCC): ICD-10-CM

## 2021-03-09 DIAGNOSIS — G47.33 OSA (OBSTRUCTIVE SLEEP APNEA): ICD-10-CM

## 2021-03-09 DIAGNOSIS — J20.9 ACUTE BRONCHITIS, UNSPECIFIED ORGANISM: ICD-10-CM

## 2021-03-09 DIAGNOSIS — J45.40 MODERATE PERSISTENT ASTHMA WITHOUT COMPLICATION: Primary | ICD-10-CM

## 2021-03-09 PROCEDURE — 99213 OFFICE O/P EST LOW 20 MIN: CPT | Performed by: INTERNAL MEDICINE

## 2021-03-09 RX ORDER — PREDNISONE 20 MG/1
40 TABLET ORAL DAILY
Qty: 10 TABLET | Refills: 0 | Status: SHIPPED | OUTPATIENT
Start: 2021-03-09 | End: 2021-03-14

## 2021-03-09 RX ORDER — AZITHROMYCIN 250 MG/1
250 TABLET, FILM COATED ORAL SEE ADMIN INSTRUCTIONS
Qty: 6 TABLET | Refills: 0 | Status: SHIPPED | OUTPATIENT
Start: 2021-03-09 | End: 2021-03-14

## 2021-03-09 RX ORDER — IBUPROFEN 800 MG/1
TABLET ORAL
COMMUNITY
Start: 2021-02-18 | End: 2022-05-21

## 2021-03-09 ASSESSMENT — SLEEP AND FATIGUE QUESTIONNAIRES
HOW LIKELY ARE YOU TO NOD OFF OR FALL ASLEEP WHILE SITTING QUIETLY AFTER LUNCH WITHOUT ALCOHOL: 0
HOW LIKELY ARE YOU TO NOD OFF OR FALL ASLEEP WHILE WATCHING TV: 1
ESS TOTAL SCORE: 4
HOW LIKELY ARE YOU TO NOD OFF OR FALL ASLEEP WHEN YOU ARE A PASSENGER IN A CAR FOR AN HOUR WITHOUT A BREAK: 0

## 2021-03-09 NOTE — PROGRESS NOTES
PULMONARY OUTPATIENT  PROGRESS NOTE    Telehealth visit  Doxy me/visual virtual visit. Patient:  Marnie Vigil  YOB: 1955    MRN: [id number]     Acct:        Pt seen and Chart reviewed. Mr/Ms Marnie Vigil is here in followup for    Diagnosis Orders   1. Moderate persistent asthma without complication     2. JOJO (obstructive sleep apnea)     3. [facility]       She is here for follow-up of asthma, dyspnea and history of lymphocytic interstitial pneumonia and JOJO   slight more cough, no yellow sputum, no wheezing  Cold symptoms last week  Post nasal drip  covid negative  Nasal congestion  No fever, some headache, bodyache, no diarrhea or vomiting  No yellow sputum, no chest pain    Since she was seen last time her asthma symptoms are stable. Since she was seen last time she had no history of exacerbation no ER visit or hospitalization for asthma. Because of COVID-19 pandemic she is working from home for last 3 to 4 months and mostly staying at home since pandemic is started  She denies chronic cough, denies daily or persistent cough except occasionally. She denies sputum production denies change in the color of the sputum or purulent sputum. She denies wheezing and denies nocturnal awakening with cough wheezing or chest tightness. Denies orthopnea PND or pedal edema. She is taking Advair twice daily and very occasional use of albuterol she had not use albuterol for some time now. She does have postnasal drip, nasal congestion she denies any yellow nasal discharge. She is taking Flonase nasal spray and take Zyrtec as needed but not daily. Her heartburn and reflux symptoms is controlled on Prilosec which she take it daily.     She is followed by rheumatology Dr Cyrus Louie for rheumatoid arthritis and she is on [place] and on prednisone 1 mg daily   She denies significant symptoms of morning stiffness or joint pain, since she started on Rosalene Vira she is doing better. She has history of noncompliance with CPAP and has not been using CPAP for more then 5 years. She does not take naps during the daytime, no dozing off during the daytime and she feels fresh in the morning, sleeps well, no tiredness or fatigue during the daytime      CXR is done on 01/03/20 and showed no acute or chronic changes and no change from 2015 CXR. She has history of abnormal PFTs with a restrictive pattern and decrease DLCO for many years, she had a high-resolution CT of the chest done in 2017 which did not show areas of significant fibrosis or ground glass changes at that time. Previous history/previous course  She has h/o Unspecified CTD/RA, ILD which was diagnosed to be LIP on open lung biopsy done many years ago and mild reduction in lung volumes and dlco and has stable PFTs and stable symptoms until last visit when there was decline in FEV1 and FVC, lung volumes and DLCO and she was scheduled  for HRCT Chest and ECHO, her high-resolution CT of the chest last time did not show active alveolitis, groundglass changes or significant fibrotic changes, her echo shows normal LV function but was difficult to estimated RV systolic pressure which shows normal RV size and function and she was instructed that she needs Rheumatology     In the past she was on prednisone then plaquenil and then Ariva and then she was stopped by her rheumatology and she was not seen by Dr Esdras Cuevas for some time.     She has h/o JOJO diagnosed severe JOJO in 2005 but she is not using cpap for some times now and still non compliant with cpap and denies symptoms of EDS, and daytime naps and unrefresh sleep        Subjective:   Review of Systems -   General ROS: negative for - chills, fever, hot flashes, night sweats, weight gain or weight loss  ENT ROS: Negative for nasal congestion postnasal dripping, negative for - epistaxis, nasal congestion, nasal polyps, oral lesions or sneezing  Allergy and Immunology ROS: Positive for - nasal congestion, postnasal drip or seasonal allergies  Respiratory ROS: Negative for - cough, negative for hemoptysis, orthopnea, sputum changes, stridor, tachypnea or wheezing  Cardiovascular ROS: negative for - chest pain, dyspnea on exertion, edema, loss of consciousness, orthopnea or paroxysmal nocturnal dyspnea  Gastrointestinal ROS: no abdominal pain, change in bowel habits, or black or bloody stools  Musculoskeletal ROS: Positive for joints pain and stiffness, negative for joint swelling   Genitourinary: Negative for hematuria or dysuria frequency and nocturia. CNS negative for dizziness vertigo weakness numbness tingling, syncope, headache, seizure. Skin negative for rash and a skin lesion. Hematology oncology negative for easy bleeding and bruising and petechia    Sleep Medicine 3/9/2021 9/12/2017 3/7/2017   Sitting and reading 0 0 0   Watching TV 1 0 1   Sitting, inactive in a public place (e.g. a theatre or a meeting) 0 0 0   As a passenger in a car for an hour without a break 0 0 0   Lying down to rest in the afternoon when circumstances permit 3 1 0   Sitting and talking to someone 0 0 0   Sitting quietly after a lunch without alcohol 0 0 0   In a car, while stopped for a few minutes in traffic 0 0 0   Total score 4 1 1       Allergies:   Allergies   Allergen Reactions    Nubain [Nalbuphine Hcl]     Penicillins        Medications:  Outpatient Encounter Medications as of 3/9/2021   Medication Sig Dispense Refill    ibuprofen (ADVIL;MOTRIN) 800 MG tablet       Tofacitinib Citrate (XELJANZ PO) Take by mouth daily      fluticasone-salmeterol (ADVAIR DISKUS) 250-50 MCG/DOSE AEPB Inhale 1 puff into the lungs 2 times daily 1 Inhaler 11    albuterol sulfate HFA (VENTOLIN HFA) 108 (90 Base) MCG/ACT inhaler Inhale 2 puffs into the lungs every 6 hours as needed for Wheezing 1 Inhaler 11    amLODIPine (NORVASC) 10 MG tablet Take 10 mg by mouth daily      nebivolol (BYSTOLIC) 10 MG tablet Take  by mouth daily.  Tramadol-Acetaminophen (ULTRACET PO) Take  by mouth daily as needed.  Baricitinib (OLUMIANT) 2 MG TABS Take 2 mg by mouth daily      predniSONE (DELTASONE) 1 MG tablet Take 1 mg by mouth daily       No facility-administered encounter medications on file as of 3/9/2021. Objective:    Physical Exam:  Vitals: There were no vitals taken for this visit. Last 3 weights: Wt Readings from Last 3 Encounters:   03/18/20 255 lb (115.7 kg)   01/14/20 253 lb 3.2 oz (114.9 kg)   06/04/19 254 lb 12.8 oz (115.6 kg)     There is no height or weight on file to calculate BMI.     Physical Examination:   General appearance - alert, well appearing, and in no distress  Mental status - alert, oriented to person, place, and time  Eyes - Extraocular eye movements intact  Ears - right ear normal, left ear normal  Nose -not examined   mouth -not examined  Neck - Short and thick neck  Chest -no tachypnea, no retraction and no distress on visual exam  Heart -not examined   Abdomen -not examined  Neurological - alert, oriented, normal speech, no focal findings or movement disorder noted  Extremities -not examined  Skin -not examined    Labs:  None    CBC:   WBC   Date Value Ref Range Status   06/04/2015 10.0 3.5 - 11.0 k/uL Final     Hemoglobin   Date Value Ref Range Status   06/04/2015 14.1 12.0 - 16.0 g/dL Final     Platelets   Date Value Ref Range Status   06/04/2015 251 140 - 450 k/uL Final     BMP:   Sodium   Date Value Ref Range Status   06/04/2015 144 135 - 144 mmol/L Final     Potassium   Date Value Ref Range Status   06/04/2015 4.5 3.7 - 5.3 mmol/L Final     Chloride   Date Value Ref Range Status   06/04/2015 103 98 - 107 mmol/L Final     CO2   Date Value Ref Range Status   06/04/2015 25 20 - 31 mmol/L Final     BUN   Date Value Ref Range Status   06/04/2015 11 6 - 20 mg/dL Final     CREATININE   Date Value Ref Range Status   06/04/2015 0.50 0.50 - 0.90 mg/dL Final 05/03/2014 0.48 (L) 0.50 - 0.90 mg/dL Final     Glucose   Date Value Ref Range Status   06/04/2015 75 70 - 99 mg/dL Final     S. Calcium:   Calcium   Date Value Ref Range Status   06/04/2015 9.1 8.6 - 10.4 mg/dL Final     S. Ionized Calcium: No results found for: IONCA  S. Magnesium: No results found for: MG  S. Phosphorus: No results found for: PHOS  S. Glucose: No results found for: POCGLU  Glycosylated hemoglobin A1C: No results found for: LABA1C    Pulmonary Functions Testing Results:    6/12/18: FEV1 1.75 69%, FVC 2.08 65%, FEV1/%, TLC 3.29 63%, DLCO 11.27 51%  9/12/17: FEV1 1.71 57%, FVC 2.08 54%, [id number] 105%, TLC 3.24 53%, DLCO 11.46 41%  1/19/16: FEV1 1.86 79%, FVC 2.27 72%, [id number] 110%, TLC 3.37 67%, DLCO 12.82 62%     Blood Gases: No results found for: PH, PCO2, PO2, HCO3, O2SAT    Radiological reports:    CXR  01/03/2020  The mediastinal and cardiac contours are normal.  There is no focal    consolidation, pleural effusion or pneumothorax.  Scarring in the left upper    lobe is unchanged. CT Scans    HRCT Chest     10/11/17  Stable appearing HRCT with no acute process and redemonstration of few   nonspecific 3-4 mm pulmonary nodules as well as left lung scarring. 1/16/16  No significant ground glass opacities, intralobular septal thickening/interstitial opacities, centrilobular nodules, emphysematous changes, bronchiectasis, fibrosis/honeycombing.  No pleural effusion. Stable areas of scarring are again noted in    the left lung. Stable 3 mm peripheral right upper lobe nodule on series 4 image 21. Stable pleural-based posterior left lower lobe nodule measuring 3-4 mm on image 32. Prior cholecystectomy. No significant osseous abnormality with mild hypertrophic    degenerative changes in thoracic spine. EKG:   ECHO: 10/11/2017  Technically difficult study due to patients body habitus. Left ventricle is normal in size.  Global left ventricular systolic function  is normal. Estimated ejection fraction is 55 % . Difficult to estimate right ventricular systolic pressure due to body  habitus. Normal right ventricular size and function. No significant pericardial effusion is seen. Assessment:    ICD-10-CM    1. Moderate persistent asthma without complication  Y70.24    2. JOJO (obstructive sleep apnea)  G47.33    3. LIP (lymphoid interstitial pneumonitis) ([facility] [address]  J84.2        PFT's  from jan 2016 and sep 2017 and June 2018 showed decline in DLCO and TLC and FEV1 and FVC, as compared to 2016 but is stable from September 2017. HRCT Chest from October 11, 2017 and also from January 16, 2016 did not show significant ground glass changes significant fibrotic changes, no significant septal thickening  bronchiectatic changes or traction bronchiectasis. Chest x-ray from 01/03/2020 and no acute or chronic changes seen mild area of platelike atelectasis in left upper lung field is the same as it was before not much change in chest x-ray from 2015. Plan:    Zithromax pack  Short course of prednisone  Follow up Rheumatology and prednisone per rheumatology  Recommend covid vaccine after consultation with rheumatology on xelgen  Continue  Advair 250/50   Advised to avoid allergens and triggers   Albuterol as needed  Flonase daily and Zyrtec to continue as needed  Vaccinations recommended and she does not want vaccine    Recommend flu vaccine annually in fall  Recommend Pneumonia vaccine  Maintain an active lifestyle     Encourage CPAP use, discussed with her again today and she does not want to try CPAP again. Wt loss is recommended and discussed  Follow good sleep hygeine instructions  Questions answered pertaining to diagnosis and management explained importance of compliance with therapy. RTC in 6 months    Please note that this chart was generated using voice recognition Dragon dictation software.  Although every effort was made to ensure the accuracy of this automated transcription, some errors in transcription may have occurred. Israel Hampton MD             3/9/2021, 9:34 AM     Sydnie Welsh is a 63 y.o. female being evaluated by a Virtual Visit (video/telephone visit) encounter to address concerns as mentioned above. A caregiver was present when appropriate. Due to this being a TeleHealth encounter (During YPBFT-19 public health emergency), evaluation of the following organ systems was limited: Vitals/Constitutional/EENT/Resp/CV/GI//MS/Neuro/Skin/Heme-Lymph-Imm. Pursuant to the emergency declaration under the [address] [place] [address], [facility] waiver authority and the [employer] Act, this Virtual Visit was conducted with patient's  consent, to reduce the patient's risk of exposure to COVID-19 and provide necessary medical care. The patient has also been advised to contact this office for worsening conditions or problems, and seek emergency medical treatment and/or call 911 if deemed necessary. Patient identification was verified at the start of the visit: Yes    Total time spent for this encounter: 23 minutes    Services were provided through a video/telephone synchronous discussion virtually to substitute for in-person clinic visit. Patient and provider were located at their individual locations at home and office respectively. --Israel Hampton MD on 3/9/2021 at 9:34 AM    An electronic signature was used to authenticate this note.

## 2021-04-09 ENCOUNTER — NURSE ONLY (OUTPATIENT)
Dept: PRIMARY CARE CLINIC | Age: 66
End: 2021-04-09

## 2021-04-09 ENCOUNTER — HOSPITAL ENCOUNTER (OUTPATIENT)
Age: 66
Setting detail: SPECIMEN
Discharge: HOME | End: 2021-04-09
Payer: COMMERCIAL

## 2021-04-09 ENCOUNTER — VIRTUAL VISIT (OUTPATIENT)
Dept: PULMONOLOGY | Age: 66
End: 2021-04-09
Payer: COMMERCIAL

## 2021-04-09 DIAGNOSIS — J30.9 ALLERGIC RHINITIS, UNSPECIFIED SEASONALITY, UNSPECIFIED TRIGGER: ICD-10-CM

## 2021-04-09 DIAGNOSIS — J84.2 LIP (LYMPHOID INTERSTITIAL PNEUMONITIS) (HCC): ICD-10-CM

## 2021-04-09 DIAGNOSIS — J45.40 MODERATE PERSISTENT ASTHMA WITHOUT COMPLICATION: Primary | ICD-10-CM

## 2021-04-09 DIAGNOSIS — G47.33 OSA (OBSTRUCTIVE SLEEP APNEA): ICD-10-CM

## 2021-04-09 DIAGNOSIS — J20.9 ACUTE BRONCHITIS, UNSPECIFIED ORGANISM: ICD-10-CM

## 2021-04-09 PROCEDURE — 99213 OFFICE O/P EST LOW 20 MIN: CPT | Performed by: INTERNAL MEDICINE

## 2021-04-09 RX ORDER — PREDNISONE 10 MG/1
TABLET ORAL
Qty: 30 TABLET | Refills: 0 | Status: SHIPPED | OUTPATIENT
Start: 2021-04-09

## 2021-04-09 RX ORDER — LEVOFLOXACIN 500 MG/1
500 TABLET, FILM COATED ORAL DAILY
Qty: 10 TABLET | Refills: 0 | Status: SHIPPED | OUTPATIENT
Start: 2021-04-09 | End: 2021-04-19

## 2021-04-09 NOTE — PATIENT INSTRUCTIONS
I called the patient to schedule the tests. I had to leave her a message to have her call me back.  KF

## 2021-04-09 NOTE — PROGRESS NOTES
PULMONARY OUTPATIENT  PROGRESS NOTE    Telehealth visit  Doxy me/visual virtual visit. Patient:  Kole Leung  YOB: 1955    MRN: [id number]     Acct:        Pt seen and Chart reviewed. Mr/Ms Kole Leung is here in followup for    Diagnosis Orders   1. Moderate persistent asthma without complication     2. JOJO (obstructive sleep apnea)     3. LIP (lymphoid interstitial pneumonitis) ([facility] [address]     4. Acute bronchitis, unspecified organism     5. Allergic rhinitis, unspecified seasonality, unspecified trigger       She was seen last time on 2021 as doxy. me visit. This is a sick call as she called the office for cough. According to patient about 2-1/2 weeks ago she had to go emergently to visit her mother in [place]. She came back in her mother  and she has to go back to attend her .  According to patient she started having postnasal drip nasal congestion and headache even before she left she had a Zithromax pack and short course of prednisone and she felt better for few days but then when she has to go back and forth she started having worsening problem. She started having cough congestion in her chest she feels like that her lungs were filling up she continued to have postnasal drip and nasal congestion which she still have it she does not complain of pleuritic or chest pain. She does feel tired and fatigue especially since she started going back to work. She does not complain of fever but she claims that she feels chills she does not have nausea vomiting diarrhea abdominal pain and she does not complain of urinary frequency and burning or hematuria. Her headache is better occasionally complain of headache because of sinus issues. She also complain of hoarseness of voice sometime. She does have wheezing off and on. She denies orthopnea PND or pedal edema or leg pain.     According to patient few weeks ago she went and had a rapid Covid test done which was negative she does not know the place where it was done. She does not check her oxygen at home she claims that she gets short of breath when she cough. She denies weight loss or loss of appetite. She is taking Advair twice daily she has albuterol use off and on but denies daily use of albuterol. She does have Flonase nasal spray and she takes Zyrtec as needed. She denies increased reflux or heartburn symptoms and she is on Prilosec. She had not had Covid vaccine as she was waiting for her rheumatologist as she is on Valladares Wilmer and she was told that she has to be off 5 days to get the Covid vaccine which she is not currently off. She is followed by rheumatology Dr Abe [place] for rheumatoid arthritis and she is on Valladares Wilmer and on prednisone 1 mg daily   She denies significant symptoms of morning stiffness or joint pain, since she started on Valladares Wilmer she is doing better. She has history of noncompliance with CPAP and has not been using CPAP for more then 5 years. She does not take naps during the daytime, no dozing off during the daytime and she feels fresh in the morning, sleeps well, no tiredness or fatigue during the daytime      Last CXR is done on 01/03/20 and showed no acute or chronic changes and no change from 2015 CXR. She has history of abnormal PFTs with a restrictive pattern and decrease DLCO for many years, she had a high-resolution CT of the chest done in 2017 which did not show areas of significant fibrosis or ground glass changes at that time.       Previous history/previous course  She has h/o Unspecified CTD/RA, ILD which was diagnosed to be LIP on open lung biopsy done many years ago and mild reduction in lung volumes and dlco and has stable PFTs and stable symptoms until last visit when there was decline in FEV1 and FVC, lung volumes and DLCO and she was scheduled  for HRCT Chest and ECHO, her high-resolution CT of the chest last time did not show active alveolitis, groundglass changes or significant fibrotic changes, her echo shows normal LV function but was difficult to estimated RV systolic pressure which shows normal RV size and function and she was instructed that she needs Rheumatology     In the past she was on prednisone then plaquenil and then Ariva and then she was stopped by her rheumatology and she was not seen by Dr Reesa Severance for some time. She has h/o JOJO diagnosed severe JOJO in 2005 but she is not using cpap for some times now and still non compliant with cpap and denies symptoms of EDS, and daytime naps and unrefresh sleep        Subjective:   Review of Systems -   General ROS: Positive for chills sometimes, positive for tiredness, negative for fever, hot flashes, night sweats, weight gain or weight loss  ENT ROS: Positive for nasal congestion, postnasal dripping, nasal congestion negative for - epistaxis, nasal polyps, oral lesions or sneezing  Allergy and Immunology ROS: Positive for - nasal congestion, postnasal drip or seasonal allergies  Respiratory ROS: Positive for - cough, wheezing sometimes and sometimes sputum production, negative for hemoptysis, orthopnea, stridor, tachypnea. Cardiovascular ROS: negative for - chest pain, dyspnea on exertion, edema, loss of consciousness, orthopnea or paroxysmal nocturnal dyspnea  Gastrointestinal ROS: no abdominal pain, change in bowel habits, or black or bloody stools  Musculoskeletal ROS: Positive for joints pain and stiffness, negative for joint swelling   Genitourinary: Negative for hematuria or dysuria frequency and nocturia. CNS negative for dizziness vertigo weakness numbness tingling, syncope, headache, seizure. Skin negative for rash and a skin lesion.   Hematology oncology negative for easy bleeding and bruising and petechia    Sleep Medicine 3/9/2021 9/12/2017 3/7/2017   Sitting and reading 0 0 0   Watching TV 1 0 1   Sitting, inactive in a public place (e.g. a theatre or a meeting) 0 0 0   As a passenger in a car for an hour without a break 0 0 0   Lying down to rest in the afternoon when circumstances permit 3 1 0   Sitting and talking to someone 0 0 0   Sitting quietly after a lunch without alcohol 0 0 0   In a car, while stopped for a few minutes in traffic 0 0 0   Total score 4 1 1       Allergies: Allergies   Allergen Reactions    Nubain [Nalbuphine Hcl]     Penicillins        Medications:  Outpatient Encounter Medications as of 4/9/2021   Medication Sig Dispense Refill    ibuprofen (ADVIL;MOTRIN) 800 MG tablet       Tofacitinib Citrate (XELJANZ PO) Take by mouth daily      Baricitinib (OLUMIANT) 2 MG TABS Take 2 mg by mouth daily      albuterol sulfate HFA (VENTOLIN HFA) 108 (90 Base) MCG/ACT inhaler Inhale 2 puffs into the lungs every 6 hours as needed for Wheezing 1 Inhaler 11    predniSONE (DELTASONE) 1 MG tablet Take 1 mg by mouth daily      amLODIPine (NORVASC) 10 MG tablet Take 10 mg by mouth daily      nebivolol (BYSTOLIC) 10 MG tablet Take  by mouth daily.  fluticasone-salmeterol (ADVAIR DISKUS) 250-50 MCG/DOSE AEPB Inhale 1 puff into the lungs 2 times daily 1 Inhaler 11    Tramadol-Acetaminophen (ULTRACET PO) Take  by mouth daily as needed. No facility-administered encounter medications on file as of 4/9/2021. Objective:    Physical Exam:  Vitals: There were no vitals taken for this visit. Last 3 weights: Wt Readings from Last 3 Encounters:   03/18/20 255 lb (115.7 kg)   01/14/20 253 lb 3.2 oz (114.9 kg)   06/04/19 254 lb 12.8 oz (115.6 kg)     There is no height or weight on file to calculate BMI.     Physical Examination:   General appearance - alert, well appearing, and in no distress  Mental status - alert, oriented to person, place, and time  Eyes - Extraocular eye movements intact  Ears - right ear normal, left ear normal  Nose -not examined   mouth -not examined  Neck - Short and thick neck  Chest -no tachypnea, no retraction and no distress on left upper    lobe is unchanged. CT Scans    HRCT Chest     10/11/17  Stable appearing HRCT with no acute process and redemonstration of few   nonspecific 3-4 mm pulmonary nodules as well as left lung scarring. 1/16/16  No significant ground glass opacities, intralobular septal thickening/interstitial opacities, centrilobular nodules, emphysematous changes, bronchiectasis, fibrosis/honeycombing.  No pleural effusion. Stable areas of scarring are again noted in    the left lung. Stable 3 mm peripheral right upper lobe nodule on series 4 image 21. Stable pleural-based posterior left lower lobe nodule measuring 3-4 mm on image 32. Prior cholecystectomy. No significant osseous abnormality with mild hypertrophic    degenerative changes in thoracic spine. EKG:   ECHO: 10/11/2017  Technically difficult study due to patients body habitus. Left ventricle is normal in size. Global left ventricular systolic function  is normal. Estimated ejection fraction is 55 % . Difficult to estimate right ventricular systolic pressure due to body  habitus. Normal right ventricular size and function. No significant pericardial effusion is seen. Assessment:    ICD-10-CM    1. Moderate persistent asthma without complication  C94.79    2. JOJO (obstructive sleep apnea)  G47.33    3. LIP (lymphoid interstitial pneumonitis) ([facility])  J84.2    4. Acute bronchitis, unspecified organism  J20.9    5. Allergic rhinitis, unspecified seasonality, unspecified trigger  J30.9        PFT's  from jan 2016 and sep 2017 and June 2018 showed decline in DLCO and TLC and FEV1 and FVC, as compared to 2016 but is stable from September 2017. HRCT Chest from October 11, 2017 and also from January 16, 2016 did not show significant ground glass changes significant fibrotic changes, no significant septal thickening  bronchiectatic changes or traction bronchiectasis.     Chest x-ray from 01/03/2020 and no acute or chronic changes seen mild area of platelike atelectasis in left upper lung field is the same as it was before not much change in chest x-ray from 2015. Plan:     Advised patient to get Covid testing done would prefer Covid PCR. COVID-19 test ordered  Chest x-ray PA lateral view to be done  CBC with differential and CRP  I advised the patient if she feels short of breath or worsening symptoms she should go to the emergency room  Levofloxacin 500 mg once daily for 7 to 10 days to start after and if Covid is negative  Prednisone taper dose  Follow up Rheumatology and regular dose of 4 prednisone per rheumatology  Recommend covid vaccine after consultation with rheumatology on [facility] 250/50   Advised to avoid allergens and triggers   Albuterol as needed  Flonase daily and Zyrtec to continue as needed  Vaccinations recommended and she does not want vaccine    Recommend flu vaccine annually in fall  Recommend Pneumonia vaccine  Maintain an active lifestyle     Encourage CPAP use, discussed with her on past visit and she does not want to try CPAP again. Wt loss is recommended and discussed  Follow good sleep hygeine instructions  Questions answered pertaining to diagnosis and management explained importance of compliance with therapy. RTC in 1 months    Please note that this chart was generated using voice recognition Dragon dictation software. Although every effort was made to ensure the accuracy of this automated transcription, some errors in transcription may have occurred. Juan Garcia MD             4/9/2021, 2:16 PM     Dalton Arana is a 61 y.o. female being evaluated by a Virtual Visit (video/telephone visit) encounter to address concerns as mentioned above. A caregiver was present when appropriate. Due to this being a TeleHealth encounter (During OYCNF-40 public health emergency), evaluation of the following organ systems was limited: Vitals/Constitutional/EENT/Resp/CV/GI//MS/Neuro/Skin/Heme-Lymph-Imm. Pursuant to the emergency declaration under the [facility], [facility] waiver authority and the [employer] Act, this Virtual Visit was conducted with patient's  consent, to reduce the patient's risk of exposure to COVID-19 and provide necessary medical care. The patient has also been advised to contact this office for worsening conditions or problems, and seek emergency medical treatment and/or call 911 if deemed necessary. Patient identification was verified at the start of the visit: Yes    Total time spent for this encounter: 25 minutes    Services were provided through a video/telephone synchronous discussion virtually to substitute for in-person clinic visit. Patient and provider were located at their individual locations at home and office respectively. --Lisa Solorio MD on 4/9/2021 at 2:16 PM    An electronic signature was used to authenticate this note.

## 2021-04-10 DIAGNOSIS — J20.9 ACUTE BRONCHITIS, UNSPECIFIED ORGANISM: ICD-10-CM

## 2021-04-11 LAB
SARS-COV-2: NORMAL
SARS-COV-2: NOT DETECTED
SOURCE: NORMAL

## 2021-04-12 ENCOUNTER — HOSPITAL ENCOUNTER (OUTPATIENT)
Age: 66
Discharge: HOME | End: 2021-04-14
Payer: COMMERCIAL

## 2021-04-12 ENCOUNTER — HOSPITAL ENCOUNTER (OUTPATIENT)
Dept: GENERAL RADIOLOGY | Age: 66
Discharge: HOME | End: 2021-04-14
Payer: COMMERCIAL

## 2021-04-12 ENCOUNTER — TELEPHONE (OUTPATIENT)
Dept: PRIMARY CARE CLINIC | Age: 66
End: 2021-04-12

## 2021-04-12 ENCOUNTER — HOSPITAL ENCOUNTER (OUTPATIENT)
Age: 66
Discharge: HOME | End: 2021-04-12
Payer: COMMERCIAL

## 2021-04-12 DIAGNOSIS — J20.9 ACUTE BRONCHITIS, UNSPECIFIED ORGANISM: ICD-10-CM

## 2021-04-12 LAB
ABSOLUTE EOS #: 0.16 K/UL (ref 0–0.44)
ABSOLUTE IMMATURE GRANULOCYTE: 0.06 K/UL (ref 0–0.3)
ABSOLUTE LYMPH #: 2.34 K/UL (ref 1.1–3.7)
ABSOLUTE MONO #: 1.19 K/UL (ref 0.1–1.2)
BASOPHILS # BLD: 1 % (ref 0–2)
BASOPHILS ABSOLUTE: 0.08 K/UL (ref 0–0.2)
C-REACTIVE PROTEIN: 21.1 MG/L (ref 0–5)
DIFFERENTIAL TYPE: ABNORMAL
EOSINOPHILS RELATIVE PERCENT: 1 % (ref 1–4)
HCT VFR BLD CALC: 41 % (ref 36.3–47.1)
HEMOGLOBIN: 13.2 G/DL (ref 11.9–15.1)
IMMATURE GRANULOCYTES: 1 %
LYMPHOCYTES # BLD: 18 % (ref 24–43)
MCH RBC QN AUTO: 31.2 PG (ref 25.2–33.5)
MCHC RBC AUTO-ENTMCNC: 32.2 G/DL (ref 28.4–34.8)
MCV RBC AUTO: 96.9 FL (ref 82.6–102.9)
MONOCYTES # BLD: 9 % (ref 3–12)
NRBC AUTOMATED: 0 PER 100 WBC
PDW BLD-RTO: 12.9 % (ref 11.8–14.4)
PLATELET # BLD: ABNORMAL K/UL (ref 138–453)
PLATELET ESTIMATE: ABNORMAL
PLATELET, FLUORESCENCE: NORMAL K/UL (ref 138–453)
PLATELET, IMMATURE FRACTION: NORMAL % (ref 1.1–10.3)
PMV BLD AUTO: ABNORMAL FL (ref 8.1–13.5)
RBC # BLD: 4.23 M/UL (ref 3.95–5.11)
RBC # BLD: ABNORMAL 10*6/UL
SEG NEUTROPHILS: 71 % (ref 36–65)
SEGMENTED NEUTROPHILS ABSOLUTE COUNT: 9.44 K/UL (ref 1.5–8.1)
WBC # BLD: 13.3 K/UL (ref 3.5–11.3)
WBC # BLD: ABNORMAL 10*3/UL

## 2021-04-12 PROCEDURE — 85055 RETICULATED PLATELET ASSAY: CPT

## 2021-04-12 PROCEDURE — 85025 COMPLETE CBC W/AUTO DIFF WBC: CPT

## 2021-04-12 PROCEDURE — 71046 X-RAY EXAM CHEST 2 VIEWS: CPT

## 2021-04-12 PROCEDURE — 36415 COLL VENOUS BLD VENIPUNCTURE: CPT

## 2021-04-12 PROCEDURE — 86140 C-REACTIVE PROTEIN: CPT

## 2021-04-15 ENCOUNTER — TELEPHONE (OUTPATIENT)
Dept: PULMONOLOGY | Age: 66
End: 2021-04-15

## 2021-04-15 NOTE — TELEPHONE ENCOUNTER
4/13/2021 4:01 PM Jeovany Juárez, I have a list of orders for a patient from when I was on check out last week. I cant put a name on who these orders were for. The patient needs lab work for a cbc differential, a covid pcr and a crp. they also need a chest xray and a 4 week follow up. The patient was advised to go to the ER if their shortness of breath continued. There were 2 medications that were sent to the pharmacy. Do you by chance remember who these orders were for? Thank you Faith Mendoza Read 4/13/2021 4:01 PM     4/13/2021 4:02 PM I am in the back room you can come and talk to me    I spoke with Dr. Britney Juárez, everything has been figured out, follow up has been made.

## 2021-05-12 ENCOUNTER — VIRTUAL VISIT (OUTPATIENT)
Dept: PULMONOLOGY | Age: 66
End: 2021-05-12
Payer: COMMERCIAL

## 2021-05-12 DIAGNOSIS — J30.9 ALLERGIC RHINITIS, UNSPECIFIED SEASONALITY, UNSPECIFIED TRIGGER: ICD-10-CM

## 2021-05-12 DIAGNOSIS — J84.2 LIP (LYMPHOID INTERSTITIAL PNEUMONITIS) (HCC): ICD-10-CM

## 2021-05-12 DIAGNOSIS — G47.33 OBSTRUCTIVE SLEEP APNEA: ICD-10-CM

## 2021-05-12 DIAGNOSIS — J45.40 MODERATE PERSISTENT ASTHMA WITHOUT COMPLICATION: Primary | ICD-10-CM

## 2021-05-12 DIAGNOSIS — G47.33 OSA (OBSTRUCTIVE SLEEP APNEA): ICD-10-CM

## 2021-05-12 PROCEDURE — 99214 OFFICE O/P EST MOD 30 MIN: CPT | Performed by: INTERNAL MEDICINE

## 2021-05-12 RX ORDER — ALBUTEROL SULFATE 90 UG/1
2 AEROSOL, METERED RESPIRATORY (INHALATION) EVERY 6 HOURS PRN
Qty: 1 INHALER | Refills: 5 | Status: SHIPPED | OUTPATIENT
Start: 2021-05-12

## 2021-05-12 ASSESSMENT — SLEEP AND FATIGUE QUESTIONNAIRES
HOW LIKELY ARE YOU TO NOD OFF OR FALL ASLEEP WHILE SITTING AND TALKING TO SOMEONE: 0
HOW LIKELY ARE YOU TO NOD OFF OR FALL ASLEEP WHILE SITTING QUIETLY AFTER LUNCH WITHOUT ALCOHOL: 0
HOW LIKELY ARE YOU TO NOD OFF OR FALL ASLEEP WHILE WATCHING TV: 0
HOW LIKELY ARE YOU TO NOD OFF OR FALL ASLEEP WHEN YOU ARE A PASSENGER IN A CAR FOR AN HOUR WITHOUT A BREAK: 0

## 2021-05-12 NOTE — PROGRESS NOTES
PULMONARY OUTPATIENT  PROGRESS NOTE    Telehealth visit  Doxy me/visual virtual visit. Patient:  Noelle Sunshine  YOB: 1955    MRN: [id number]     Acct:        Pt seen and Chart reviewed. Mr/Ms Noelle Sunshine is here in followup for    Diagnosis Orders   1. Moderate persistent asthma without complication     2. JOJO (obstructive sleep apnea)     3. LIP (lymphoid interstitial pneumonitis) ([facility] [address]     4. Allergic rhinitis, unspecified seasonality, unspecified trigger     5. Obstructive sleep apnea       She was seen last time on 04/09/2021 as a sick call for cough for 2-1/2 weeks. Last time on a [facility]. me visit a chest x-ray CRP CBC was ordered and a prednisone taper dose and antibiotic was ordered at that time. According to patient she started on prednisone the next day gradually she started feeling better and she finished antibiotic also. Her cough is improved she denies daily or persistent cough. According to patient she cough when she goes outside and allergies bother her there is her more significant problem. As long as she is at home she does not have a lot of allergy symptoms when she developed allergy symptoms she start hoarseness of voice also at times. She currently denies wheezing denies daily or persistent wheezing. She denies nocturnal awakening with cough wheezing chest tightness and denies shortness of breath. She is able to do her regular activities denies orthopnea PND or pedal edema. She does not complain of chest pain hemoptysis fever. She does have rheumatoid arthritis and she is on [place] and is followed by rheumatology and she is on prednisone apparently 2.5 mg a day for rheumatoid arthritis. She does get symptoms off and on some time morning stiffness and joint pain. Her Covid test was negative, CBC shows mild elevation of WBC 13 but she was already on prednisone for 2 days before she got CBC done.   Her CRP was 21 which is likely related to her rheumatoid arthritis. Her chest x-ray done on 04/12/2021 did not show any significant change from the chest x-ray done on 01/03/2020 she had a linear area of scarlike atelectasis in the left upper lung field there may be slight increase marking bilaterally not much change from previous chest x-ray. She is taking Advair twice daily she has albuterol use off and on and denies daily use of albuterol. She does have Flonase nasal spray and she takes Zyrtec daily at this time as she is having significant allergy symptoms. She denies increased reflux or heartburn symptoms and she is on Prilosec. She did not have her Covid vaccine yet and according to patient she was waiting initially because of [place] as she had to be off for 5 days to get Covid vaccine and then she had upper respiratory symptoms/acute bronchitis. She think that she will get the Covid vaccine next week. She is followed by rheumatology Dr Mamta Preciado for rheumatoid arthritis and she is on [place] and on prednisone 2.5 mg daily     She has history of noncompliance with CPAP and has not been using CPAP for more then 5 years. She does not take naps during the daytime, no dozing off during the daytime and she feels fresh in the morning, sleeps well, no tiredness or fatigue during the daytime      Last CXR is done on 01/03/20 and showed no acute or chronic changes and no change from 2015 CXR. She has history of abnormal PFTs with a restrictive pattern and decrease DLCO for many years, she had a high-resolution CT of the chest done in 2017 which did not show areas of significant fibrosis or ground glass changes at that time.       Previous history/previous course  She has h/o Unspecified CTD/RA, ILD which was diagnosed to be LIP on open lung biopsy done many years ago and mild reduction in lung volumes and dlco and has stable PFTs and stable symptoms until last visit when there was decline in FEV1 and FVC, lung volumes and DLCO and she was scheduled  for HRCT Chest and ECHO, her high-resolution CT of the chest last time did not show active alveolitis, groundglass changes or significant fibrotic changes, her echo shows normal LV function but was difficult to estimated RV systolic pressure which shows normal RV size and function and she was instructed that she needs Rheumatology     In the past she was on prednisone then plaquenil and then Ariva and then she was stopped by her rheumatology and she was not seen by Dr Marco A Ascencio for some time. She has h/o JOJO diagnosed severe JOJO in 2005 but she is not using cpap for some times now and still non compliant with cpap and denies symptoms of EDS, and daytime naps and unrefresh sleep        Subjective:   Review of Systems -   General ROS: Negative for chills sometimes, negative for tiredness, negative for fever, hot flashes, night sweats, weight gain or weight loss  ENT ROS: Positive for nasal congestion, postnasal dripping, nasal congestion negative for - epistaxis, nasal polyps, oral lesions or sneezing  Allergy and Immunology ROS: Positive for - nasal congestion, postnasal drip or seasonal allergies  Respiratory ROS: Negative for - cough, wheezing and sputum production, negative for hemoptysis, orthopnea, stridor, tachypnea. Cardiovascular ROS: negative for - chest pain, dyspnea on exertion, edema, loss of consciousness, orthopnea or paroxysmal nocturnal dyspnea  Gastrointestinal ROS: no abdominal pain, change in bowel habits, or black or bloody stools  Musculoskeletal ROS: Positive for joints pain and stiffness, negative for joint swelling   Genitourinary: Negative for hematuria or dysuria frequency and nocturia. CNS negative for dizziness vertigo weakness numbness tingling, syncope, headache, seizure. Skin negative for rash and a skin lesion.   Hematology oncology negative for easy bleeding and bruising and petechia    Sleep Medicine 5/12/2021 3/9/2021 9/12/2017 3/7/2017   Sitting and reading 0 0 0 0 Watching TV 0 1 0 1   Sitting, inactive in a public place (e.g. a theatre or a meeting) 0 0 0 0   As a passenger in a car for an hour without a break 0 0 0 0   Lying down to rest in the afternoon when circumstances permit 0 3 1 0   Sitting and talking to someone 0 0 0 0   Sitting quietly after a lunch without alcohol 0 0 0 0   In a car, while stopped for a few minutes in traffic 0 0 0 0   Total score 0 4 1 1       Allergies: Allergies   Allergen Reactions    Nubain [Nalbuphine Hcl]     Penicillins        Medications:  Outpatient Encounter Medications as of 5/12/2021   Medication Sig Dispense Refill    predniSONE (DELTASONE) 10 MG tablet Take 4 tablet once daily for 3 days then 3 tablet once daily for 3 days then 2 tablet once daily for 3 days then 1 tablet once daily for 3 days 30 tablet 0    ibuprofen (ADVIL;MOTRIN) 800 MG tablet       Tofacitinib Citrate (XELJANZ PO) Take by mouth daily      Baricitinib (OLUMIANT) 2 MG TABS Take 2 mg by mouth daily      albuterol sulfate HFA (VENTOLIN HFA) 108 (90 Base) MCG/ACT inhaler Inhale 2 puffs into the lungs every 6 hours as needed for Wheezing 1 Inhaler 11    predniSONE (DELTASONE) 1 MG tablet Take 1 mg by mouth daily      amLODIPine (NORVASC) 10 MG tablet Take 10 mg by mouth daily      nebivolol (BYSTOLIC) 10 MG tablet Take  by mouth daily.  fluticasone-salmeterol (ADVAIR DISKUS) 250-50 MCG/DOSE AEPB Inhale 1 puff into the lungs 2 times daily 1 Inhaler 11     No facility-administered encounter medications on file as of 5/12/2021. Objective:    Physical Exam:  Vitals: There were no vitals taken for this visit. Last 3 weights: Wt Readings from Last 3 Encounters:   03/18/20 255 lb (115.7 kg)   01/14/20 253 lb 3.2 oz (114.9 kg)   06/04/19 254 lb 12.8 oz (115.6 kg)     There is no height or weight on file to calculate BMI.     Physical Examination:   General appearance - alert, well appearing, and in no distress  Mental status - alert, oriented to person, place, and time  Eyes - Extraocular eye movements intact  Ears - right ear normal, left ear normal  Nose -not examined   mouth -not examined  Neck - Short and thick neck  Chest -no tachypnea, no retraction and no distress on visual exam  Heart -not examined   Abdomen -not examined  Neurological - alert, oriented, normal speech, no focal findings or movement disorder noted  Extremities -not examined  Skin -not examined    Labs:  None    CBC:   WBC   Date Value Ref Range Status   04/12/2021 13.3 (H) 3.5 - 11.3 k/uL Final     Hemoglobin   Date Value Ref Range Status   04/12/2021 13.2 11.9 - 15.1 g/dL Final     Platelets   Date Value Ref Range Status   04/12/2021 See Reflexed IPF Result 138 - 453 k/uL Final     BMP:   Sodium   Date Value Ref Range Status   06/04/2015 144 135 - 144 mmol/L Final     Potassium   Date Value Ref Range Status   06/04/2015 4.5 3.7 - 5.3 mmol/L Final     Chloride   Date Value Ref Range Status   06/04/2015 103 98 - 107 mmol/L Final     CO2   Date Value Ref Range Status   06/04/2015 25 20 - 31 mmol/L Final     BUN   Date Value Ref Range Status   06/04/2015 11 6 - 20 mg/dL Final     CREATININE   Date Value Ref Range Status   06/04/2015 0.50 0.50 - 0.90 mg/dL Final   05/03/2014 0.48 (L) 0.50 - 0.90 mg/dL Final     Glucose   Date Value Ref Range Status   06/04/2015 75 70 - 99 mg/dL Final     S. Calcium:   Calcium   Date Value Ref Range Status   06/04/2015 9.1 8.6 - 10.4 mg/dL Final     S.  Ionized Calcium: No results found for: IONCA  S. Magnesium: No results found for: MG  S. Phosphorus: No results found for: PHOS  S. Glucose: No results found for: POCGLU  Glycosylated hemoglobin A1C: No results found for: LABA1C    Pulmonary Functions Testing Results:    6/12/18: FEV1 1.75 69%, FVC 2.08 65%, FEV1/%, TLC 3.29 63%, DLCO 11.27 51%  9/12/17: FEV1 1.71 57%, FVC 2.08 54%, [id number] 105%, TLC 3.24 53%, DLCO 11.46 41%  1/19/16: FEV1 1.86 79%, FVC 2.27 72%, [id number] 110%, TLC 3.37 67%, DLCO 12.82 62%     Blood Gases: No results found for: PH, PCO2, PO2, HCO3, O2SAT    Radiological reports:    CXR    04/12/2021. Slightly increased perihilar markings when compared to previous particularly   in the infrahilar region.  Definite focal infiltrate not seen.  This could   represent bronchitis or bronchiolitis. 01/03/2020  The mediastinal and cardiac contours are normal.  There is no focal    consolidation, pleural effusion or pneumothorax.  Scarring in the left upper    lobe is unchanged. CT Scans    HRCT Chest     10/11/17  Stable appearing HRCT with no acute process and redemonstration of few   nonspecific 3-4 mm pulmonary nodules as well as left lung scarring. 1/16/16  No significant ground glass opacities, intralobular septal thickening/interstitial opacities, centrilobular nodules, emphysematous changes, bronchiectasis, fibrosis/honeycombing.  No pleural effusion. Stable areas of scarring are again noted in    the left lung. Stable 3 mm peripheral right upper lobe nodule on series 4 image 21. Stable pleural-based posterior left lower lobe nodule measuring 3-4 mm on image 32. Prior cholecystectomy. No significant osseous abnormality with mild hypertrophic    degenerative changes in thoracic spine. EKG:   ECHO: 10/11/2017  Technically difficult study due to patients body habitus. Left ventricle is normal in size. Global left ventricular systolic function  is normal. Estimated ejection fraction is 55 % . Difficult to estimate right ventricular systolic pressure due to body  habitus. Normal right ventricular size and function. No significant pericardial effusion is seen. Assessment:    ICD-10-CM    1. Moderate persistent asthma without complication  O39.40    2. JOJO (obstructive sleep apnea)  G47.33    3. LIP (lymphoid interstitial pneumonitis) ([facility])  J84.2    4. Allergic rhinitis, unspecified seasonality, unspecified trigger  J30.9    5.  Obstructive sleep apnea  G47.33 PFT's  from jan 2016 and sep 2017 and June 2018 showed decline in DLCO and TLC and FEV1 and FVC, as compared to 2016 but is stable from September 2017. HRCT Chest from October 11, 2017 and also from January 16, 2016 did not show significant ground glass changes significant fibrotic changes, no significant septal thickening  bronchiectatic changes or traction bronchiectasis. Chest x-ray from 04/12/2021, 01/03/2020 and no acute or chronic changes seen mild area of platelike atelectasis in left upper lung field is the same as it was before not much change in chest x-ray from 2015. Per radiology on chest x-ray 04/12/2021 there is slight perihilar increased marking suggestive of bronchiolitis/bronchitis. Plan: We will get high-resolution CT of the chest before next visit as patient wanted to wait and there is no acute changes. 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 We will get pulmonary function tests before next visit  Follow up Rheumatology and her regular dose of prednisone per rheumatology  Recommend to proceed with covid vaccine after consultation with rheumatology on Xeljanz  Continue  Advair 250/50 1 puff twice daily  Advised to avoid allergens and triggers   Albuterol as needed  Flonase daily and Zyrtec to continue  Vaccinations recommended   Recommend flu vaccine annually in fall  Recommend Pneumonia vaccine  Maintain an active lifestyle     Encourage CPAP use, discussed with her on past visit and she does not want to try CPAP again. Wt loss is recommended and discussed  Follow good sleep hygeine instructions  Questions answered pertaining to diagnosis and management explained importance of compliance with therapy. RTC in 3-4 months    Please note that this chart was generated using voice recognition Dragon dictation software.  Although every effort was made to ensure the accuracy of this automated transcription, some errors in transcription may have occurred. Wing Juan MD             5/12/2021, 1:53 PM     Merlin Gibbs is a 61 y.o. female being evaluated by a Virtual Visit (video/telephone visit) encounter to address concerns as mentioned above. A caregiver was present when appropriate. Due to this being a TeleHealth encounter (During OVJQC-32 public health emergency), evaluation of the following organ systems was limited: Vitals/Constitutional/EENT/Resp/CV/GI//MS/Neuro/Skin/Heme-Lymph-Imm. Pursuant to the emergency declaration under the [address] [place] [address], Pending sale to [facility] waiver authority and the [employer] and [employer] Act, this Virtual Visit was conducted with patient's  consent, to reduce the patient's risk of exposure to COVID-19 and provide necessary medical care. The patient has also been advised to contact this office for worsening conditions or problems, and seek emergency medical treatment and/or call 911 if deemed necessary. Patient identification was verified at the start of the visit: Yes  Services were provided through a video/telephone synchronous discussion virtually to substitute for in-person clinic visit. Patient and provider were located at their individual locations at home and office respectively. --Wing Juan MD on 5/12/2021 at 1:53 PM    An electronic signature was used to authenticate this note.

## 2021-05-13 ENCOUNTER — TELEPHONE (OUTPATIENT)
Dept: PULMONOLOGY | Age: 66
End: 2021-05-13

## 2021-05-13 NOTE — TELEPHONE ENCOUNTER
Scheduled PFT,CT, Covid-19 test and follow up for patient. Contacted patient and informed her of appointment dates and times.

## 2021-06-17 ENCOUNTER — HOSPITAL ENCOUNTER (OUTPATIENT)
Dept: LAB | Age: 66
Setting detail: SPECIMEN
Discharge: HOME | End: 2021-06-17
Payer: COMMERCIAL

## 2021-06-17 DIAGNOSIS — Z01.818 PREOP TESTING: Primary | ICD-10-CM

## 2021-06-17 PROCEDURE — U0005 INFEC AGEN DETEC AMPLI PROBE: HCPCS

## 2021-06-17 PROCEDURE — U0003 INFECTIOUS AGENT DETECTION BY NUCLEIC ACID (DNA OR RNA); SEVERE ACUTE RESPIRATORY SYNDROME CORONAVIRUS 2 (SARS-COV-2) (CORONAVIRUS DISEASE [COVID-19]), AMPLIFIED PROBE TECHNIQUE, MAKING USE OF HIGH THROUGHPUT TECHNOLOGIES AS DESCRIBED BY CMS-2020-01-R: HCPCS

## 2021-06-18 LAB
SARS-COV-2: NORMAL
SARS-COV-2: NOT DETECTED
SOURCE: NORMAL

## 2021-07-06 ENCOUNTER — OFFICE VISIT (OUTPATIENT)
Dept: PULMONOLOGY | Age: 66
End: 2021-07-06
Payer: COMMERCIAL

## 2021-07-06 VITALS — BODY MASS INDEX: 37.03 KG/M2 | HEART RATE: 75 BPM | OXYGEN SATURATION: 97 % | HEIGHT: 69 IN | WEIGHT: 250 LBS

## 2021-07-06 DIAGNOSIS — J45.909 ASTHMA, UNSPECIFIED ASTHMA SEVERITY, UNSPECIFIED WHETHER COMPLICATED, UNSPECIFIED WHETHER PERSISTENT: Primary | ICD-10-CM

## 2021-07-06 PROCEDURE — 94726 PLETHYSMOGRAPHY LUNG VOLUMES: CPT | Performed by: INTERNAL MEDICINE

## 2021-07-06 PROCEDURE — 94060 EVALUATION OF WHEEZING: CPT | Performed by: INTERNAL MEDICINE

## 2021-07-06 PROCEDURE — 94729 DIFFUSING CAPACITY: CPT | Performed by: INTERNAL MEDICINE

## 2021-07-06 NOTE — PROGRESS NOTES
Pulmonary function test.  Date of study 07/06/2021      Spirometry shows FVC is 1.92 61% predicted. FEV1 is 1.59 65% predicted. Both are reduced suggestive of restrictive ventilatory impairment. Lung volume shows total lung capacity is 3.16 61% predicted consistent with moderate restrictive ventilatory impairment which could be intraparenchymal.  Diffusion capacity is 19.1 888% predicted which is within normal limit. 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 Concomitant obstructive ventilatory impairment is possible as it is mild reduction in FEV1. Diffusion capacity is normal.  Clinical correlation is recommended.

## 2021-07-19 ENCOUNTER — HOSPITAL ENCOUNTER (OUTPATIENT)
Dept: CT IMAGING | Age: 66
Discharge: HOME | End: 2021-07-21
Payer: COMMERCIAL

## 2021-07-19 DIAGNOSIS — J84.2 LIP (LYMPHOID INTERSTITIAL PNEUMONITIS) (HCC): ICD-10-CM

## 2021-07-19 PROCEDURE — 71250 CT THORAX DX C-: CPT

## 2021-09-08 ENCOUNTER — OFFICE VISIT (OUTPATIENT)
Dept: PULMONOLOGY | Age: 66
End: 2021-09-08
Payer: COMMERCIAL

## 2021-09-08 VITALS
HEIGHT: 69 IN | DIASTOLIC BLOOD PRESSURE: 81 MMHG | SYSTOLIC BLOOD PRESSURE: 137 MMHG | BODY MASS INDEX: 37.47 KG/M2 | OXYGEN SATURATION: 99 % | WEIGHT: 253 LBS | HEART RATE: 77 BPM | TEMPERATURE: 97.2 F

## 2021-09-08 DIAGNOSIS — J84.2 LIP (LYMPHOID INTERSTITIAL PNEUMONITIS) (HCC): ICD-10-CM

## 2021-09-08 DIAGNOSIS — G47.33 OSA (OBSTRUCTIVE SLEEP APNEA): ICD-10-CM

## 2021-09-08 DIAGNOSIS — J45.40 MODERATE PERSISTENT ASTHMA WITHOUT COMPLICATION: Primary | ICD-10-CM

## 2021-09-08 DIAGNOSIS — J30.9 ALLERGIC RHINITIS, UNSPECIFIED SEASONALITY, UNSPECIFIED TRIGGER: ICD-10-CM

## 2021-09-08 DIAGNOSIS — G47.33 OBSTRUCTIVE SLEEP APNEA: ICD-10-CM

## 2021-09-08 PROCEDURE — 99214 OFFICE O/P EST MOD 30 MIN: CPT | Performed by: INTERNAL MEDICINE

## 2021-09-08 RX ORDER — ERGOCALCIFEROL 1.25 MG/1
50000 CAPSULE ORAL WEEKLY
COMMUNITY

## 2021-09-08 RX ORDER — ORAL SEMAGLUTIDE 7 MG/1
TABLET ORAL DAILY
COMMUNITY

## 2021-09-08 RX ORDER — ROSUVASTATIN CALCIUM 20 MG/1
20 TABLET, COATED ORAL DAILY
COMMUNITY

## 2021-09-08 ASSESSMENT — SLEEP AND FATIGUE QUESTIONNAIRES
HOW LIKELY ARE YOU TO NOD OFF OR FALL ASLEEP WHEN YOU ARE A PASSENGER IN A CAR FOR AN HOUR WITHOUT A BREAK: 0
HOW LIKELY ARE YOU TO NOD OFF OR FALL ASLEEP IN A CAR, WHILE STOPPED FOR A FEW MINUTES IN TRAFFIC: 0
HOW LIKELY ARE YOU TO NOD OFF OR FALL ASLEEP WHILE SITTING INACTIVE IN A PUBLIC PLACE: 0
HOW LIKELY ARE YOU TO NOD OFF OR FALL ASLEEP WHILE WATCHING TV: 0
ESS TOTAL SCORE: 0
HOW LIKELY ARE YOU TO NOD OFF OR FALL ASLEEP WHILE LYING DOWN TO REST IN THE AFTERNOON WHEN CIRCUMSTANCES PERMIT: 0
HOW LIKELY ARE YOU TO NOD OFF OR FALL ASLEEP WHILE SITTING AND TALKING TO SOMEONE: 0
HOW LIKELY ARE YOU TO NOD OFF OR FALL ASLEEP WHILE SITTING AND READING: 0
HOW LIKELY ARE YOU TO NOD OFF OR FALL ASLEEP WHILE SITTING QUIETLY AFTER LUNCH WITHOUT ALCOHOL: 0

## 2021-09-08 NOTE — PROGRESS NOTES
honeycomb changes minimal area of pleural-based groundglass area. This finding is no change since high-resolution CT of the chest since 2017 per radiology. (Images reviewed from both CT scan). Her pulmonary function test on 07/06/2021 is consistent with moderate restrictive ventilatory impairment her total lung capacity and FVC is only slightly reduced almost stable as compared to 2018 surprisingly her diffusion capacity was reported as normal although it had been moderately reduced previously. Last CXR is done on 01/03/20 and showed no acute or chronic changes and no change from 2015 CXR. She was on prednisone 1 mg daily for rheumatoid arthritis and she is on Cindy Failing according to patient she has been followed by rheumatology she is not on prednisone anymore. Her symptoms of rheumatoid arthritis morning stiffness and joint pain are under control. She has history of noncompliance/intolerance with CPAP and has not been using CPAP for more then 5 years.   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 on open lung biopsy done many years ago and mild reduction in lung volumes and dlco and has stable PFTs and stable symptoms until last visit when there was decline in FEV1 and FVC, lung volumes and DLCO and she was scheduled  for HRCT Chest and ECHO, her high-resolution CT of the chest last time did not show active alveolitis, groundglass changes or significant fibrotic changes, her echo shows normal LV function but was difficult to estimated RV systolic pressure which shows normal RV size and function and she was instructed that she needs Rheumatology     In the past she was on prednisone then plaquenil and then Ariva and then she was stopped by her rheumatology and she was not seen by  Karenvarsha Trinidad for some time. She has h/o JOJO diagnosed severe JOJO in 2005 but she is not using cpap for some times now and still non compliant with cpap and denies symptoms of EDS, and daytime naps and unrefresh sleep        Subjective:   Review of Systems -   General ROS: Negative for chills sometimes, negative for tiredness, negative for fever, hot flashes, night sweats, weight gain or weight loss  ENT ROS: Positive for nasal congestion, postnasal dripping, nasal congestion negative for - epistaxis, nasal polyps, oral lesions or sneezing  Allergy and Immunology ROS: Positive for - nasal congestion, mild postnasal drip or seasonal allergies  Respiratory ROS: Negative for - cough, wheezing and sputum production, negative for hemoptysis, orthopnea, stridor, tachypnea. Cardiovascular ROS: negative for - chest pain, dyspnea on exertion, edema, loss of consciousness, orthopnea or paroxysmal nocturnal dyspnea  Gastrointestinal ROS: no abdominal pain, change in bowel habits, or black or bloody stools  Musculoskeletal ROS: Positive for joints pain and stiffness, negative for joint swelling   Genitourinary: Negative for hematuria or dysuria frequency and nocturia. CNS negative for dizziness vertigo weakness numbness tingling, syncope, headache, seizure. Skin negative for rash and a skin lesion. Hematology oncology negative for easy bleeding and bruising and petechia    Sleep Medicine 9/8/2021 5/12/2021 3/9/2021 9/12/2017 3/7/2017   Sitting and reading 0 0 0 0 0   Watching TV 0 0 1 0 1   Sitting, inactive in a public place (e.g. a theatre or a meeting) 0 0 0 0 0   As a passenger in a car for an hour without a break 0 0 0 0 0   Lying down to rest in the afternoon when circumstances permit 0 0 3 1 0   Sitting and talking to someone 0 0 0 0 0   Sitting quietly after a lunch without alcohol 0 0 0 0 0   In a car, while stopped for a few minutes in traffic 0 0 0 0 0   Total score 0 0 4 1 1       Allergies:   Allergies   Allergen Reactions    Nubain [Nalbuphine Hcl]     Penicillins        Medications:  Outpatient Encounter Medications as of 9/8/2021   Medication Sig Dispense Refill    Semaglutide (RYBELSUS) 7 MG TABS Take by mouth daily      rosuvastatin (CRESTOR) 20 MG tablet Take 20 mg by mouth daily      vitamin D (ERGOCALCIFEROL) 1.25 MG (75112 UT) CAPS capsule Take 50,000 Units by mouth once a week      fluticasone-salmeterol (ADVAIR DISKUS) 250-50 MCG/DOSE AEPB Inhale 1 puff into the lungs 2 times daily 3 Inhaler 3    albuterol sulfate HFA (VENTOLIN HFA) 108 (90 Base) MCG/ACT inhaler Inhale 2 puffs into the lungs every 6 hours as needed for Wheezing or Shortness of Breath 1 Inhaler 5    ibuprofen (ADVIL;MOTRIN) 800 MG tablet       Tofacitinib Citrate (XELJANZ PO) Take by mouth daily      predniSONE (DELTASONE) 1 MG tablet Take 1 mg by mouth daily      amLODIPine (NORVASC) 10 MG tablet Take 10 mg by mouth daily      nebivolol (BYSTOLIC) 10 MG tablet Take  by mouth daily.  predniSONE (DELTASONE) 10 MG tablet Take 4 tablet once daily for 3 days then 3 tablet once daily for 3 days then 2 tablet once daily for 3 days then 1 tablet once daily for 3 days (Patient not taking: Reported on 9/8/2021) 30 tablet 0    Baricitinib (OLUMIANT) 2 MG TABS Take 2 mg by mouth daily (Patient not taking: Reported on 9/8/2021)       No facility-administered encounter medications on file as of 9/8/2021. Objective:    Physical Exam:  Vitals:   /81 (Site: Left Upper Arm, Position: Sitting, Cuff Size: Large Adult)   Pulse 77   Temp 97.2 °F (36.2 °C)   Ht 5' 9\" (1.753 m)   Wt 253 lb (114.8 kg)   SpO2 99%   BMI 37.36 kg/m²   Last 3 weights: Wt Readings from Last 3 Encounters:   09/08/21 253 lb (114.8 kg)   07/06/21 250 lb (113.4 kg)   03/18/20 255 lb (115.7 kg)     Body mass index is 37.36 kg/m².     Physical Examination:   General appearance - alert, well appearing, and in no distress  Mental status - alert, oriented to person, place, and time  Eyes - pupils equal and reactive, extraocular eye movements intact  Ears - right ear normal, left ear normal  Nose - normal and patent, no erythema, discharge or polyps  Mouth - mucous membranes moist, pharynx normal without lesions   Neck - supple, no significant adenopathy, short and thick neck  Chest - clear to auscultation, no wheezes, rales or rhonchi, symmetric air entry  Heart - normal rate, regular rhythm, normal S1, S2, no murmurs, rubs, clicks or gallops  Abdomen - soft, nontender, nondistended, no masses or organomegaly  Neurological - alert, oriented, normal speech, no focal findings or movement disorder noted  Extremities - peripheral pulses normal, no pedal edema, no clubbing or cyanosis  Skin - normal coloration and turgor, no rashes, no suspicious skin lesions noted         Labs:  None    CBC:   WBC   Date Value Ref Range Status   04/12/2021 13.3 (H) 3.5 - 11.3 k/uL Final     Hemoglobin   Date Value Ref Range Status   04/12/2021 13.2 11.9 - 15.1 g/dL Final     Platelets   Date Value Ref Range Status   04/12/2021 See Reflexed IPF Result 138 - 453 k/uL Final     BMP:   Sodium   Date Value Ref Range Status   06/04/2015 144 135 - 144 mmol/L Final     Potassium   Date Value Ref Range Status   06/04/2015 4.5 3.7 - 5.3 mmol/L Final     Chloride   Date Value Ref Range Status   06/04/2015 103 98 - 107 mmol/L Final     CO2   Date Value Ref Range Status   06/04/2015 25 20 - 31 mmol/L Final     BUN   Date Value Ref Range Status   06/04/2015 11 6 - 20 mg/dL Final     CREATININE   Date Value Ref Range Status   06/04/2015 0.50 0.50 - 0.90 mg/dL Final   05/03/2014 0.48 (L) 0.50 - 0.90 mg/dL Final     Glucose   Date Value Ref Range Status   06/04/2015 75 70 - 99 mg/dL Final     S. Calcium:   Calcium   Date Value Ref Range Status   06/04/2015 9.1 8.6 - 10.4 mg/dL Final     S.  Ionized Calcium: No results found for: IONCA  S. Magnesium: No results found for: MG  S. Phosphorus: No results found for: PHOS  S. Glucose: No results found for: POCGLU  Glycosylated hemoglobin A1C: No results found for: LABA1C    Pulmonary Functions Testing Results:    7/06/21: FEV1 1.59 65%, FVC 1.92 61%, FEV1 %, TLC 3.16 61%, DLCO 19.18 88%  6/12/18: FEV1 1.75 69%, FVC 2.08 65%, FEV1/%, TLC 3.29 63%, DLCO 11.27 51%  9/12/17: FEV1 1.71 57%, FVC 2.08 54%, [id number] 105%, TLC 3.24 53%, DLCO 11.46 41%  1/19/16: FEV1 1.86 79%, FVC 2.27 72%, [id number] 110%, TLC 3.37 67%, DLCO 12.82 62%     Blood Gases: No results found for: PH, PCO2, PO2, HCO3, O2SAT    Radiological reports:    CXR    04/12/2021. Slightly increased perihilar markings when compared to previous particularly   in the infrahilar region.  Definite focal infiltrate not seen.  This could   represent bronchitis or bronchiolitis. 01/03/2020  The mediastinal and cardiac contours are normal.  There is no focal    consolidation, pleural effusion or pneumothorax.  Scarring in the left upper    lobe is unchanged. CT Scans    HRCT Chest     10/11/17  Stable appearing HRCT with no acute process and redemonstration of few   nonspecific 3-4 mm pulmonary nodules as well as left lung scarring. 1/16/16  No significant ground glass opacities, intralobular septal thickening/interstitial opacities, centrilobular nodules, emphysematous changes, bronchiectasis, fibrosis/honeycombing.  No pleural effusion. Stable areas of scarring are again noted in    the left lung. Stable 3 mm peripheral right upper lobe nodule on series 4 image 21. Stable pleural-based posterior left lower lobe nodule measuring 3-4 mm on image 32. Prior cholecystectomy. No significant osseous abnormality with mild hypertrophic    degenerative changes in thoracic spine. EKG:   ECHO: 10/11/2017  Technically difficult study due to patients body habitus. Left ventricle is normal in size. Global left ventricular systolic function  is normal. Estimated ejection fraction is 55 % .   Difficult to estimate right ventricular systolic pressure due to body  habitus. Normal right ventricular size and function. No significant pericardial effusion is seen. Assessment:    ICD-10-CM    1. Moderate persistent asthma without complication  W30.87    2. JOJO (obstructive sleep apnea)  G47.33    3. LIP (lymphoid interstitial pneumonitis) ([facility])  J84.2    4. Allergic rhinitis, unspecified seasonality, unspecified trigger  J30.9    5. Obstructive sleep apnea  G47.33        High-resolution CT of the chest as mentioned above showed mild areas of scattered minimal linear fibrotic change with occasional area of bronchiectasis, no honeycomb changes minimal area of pleural-based groundglass area. This finding is no change since high-resolution CT of the chest since 2017 per radiology. (Images reviewed from both CT scan). Lung nodule has not changed either since 2017  Her pulmonary function test on 07/06/2021 is consistent with moderate restrictive ventilatory impairment her total lung capacity and FVC is only slightly reduced almost stable as compared to 2018 surprisingly her diffusion capacity was reported as normal although it had been moderately reduced previously. She symptomatically better and recover from her acute respiratory illness in April/May back to baseline. She is being followed by rheumatology and she is on Flora Breeding and off prednisone. Plan:     Continue  Advair 250/50 1 puff twice daily  Advised to avoid allergens and triggers   Albuterol as needed  Flonase daily and Zyrtec to continue  Follow up Rheumatology and if needed prednisone per rheumatology  She had put the dose of Covid vaccine. Recommend flu vaccine annually in fall  Recommend Pneumonia vaccine  Maintain an active lifestyle     Encourage CPAP use, discussed with her on past visit and she does not want to try CPAP again had not use it for 5 years or more and denies much daytime symptoms.     Wt loss is recommended and discussed  Follow good sleep hygeine instructions  Questions answered pertaining to diagnosis and management explained importance of compliance with therapy. RTC in 6 months    Please note that this chart was generated using voice recognition Dragon dictation software. Although every effort was made to ensure the accuracy of this automated transcription, some errors in transcription may have occurred.     Amanda Pickens MD, MD             9/8/2021, 4:16 PM

## 2022-03-25 ENCOUNTER — TELEMEDICINE (OUTPATIENT)
Dept: PULMONOLOGY | Age: 67
End: 2022-03-25
Payer: COMMERCIAL

## 2022-03-25 DIAGNOSIS — J45.40 MODERATE PERSISTENT ASTHMA WITHOUT COMPLICATION: Primary | ICD-10-CM

## 2022-03-25 DIAGNOSIS — G47.33 OSA (OBSTRUCTIVE SLEEP APNEA): ICD-10-CM

## 2022-03-25 DIAGNOSIS — J30.9 ALLERGIC RHINITIS, UNSPECIFIED SEASONALITY, UNSPECIFIED TRIGGER: ICD-10-CM

## 2022-03-25 DIAGNOSIS — J84.2 LIP (LYMPHOID INTERSTITIAL PNEUMONITIS) (HCC): ICD-10-CM

## 2022-03-25 DIAGNOSIS — U07.1 COVID-19 VIRUS INFECTION: ICD-10-CM

## 2022-03-25 PROCEDURE — 99213 OFFICE O/P EST LOW 20 MIN: CPT | Performed by: INTERNAL MEDICINE

## 2022-03-25 NOTE — PATIENT INSTRUCTIONS
VM left asking pt to call the office to schedule 6 month f/u. 3/25/22mm  Patient called back and I made appt and mailed AVS. DLR

## 2022-03-25 NOTE — PROGRESS NOTES
PULMONARY OUTPATIENT  PROGRESS NOTE    Telehealth visit. [facility] virtual visit    Patient:  Ramon Scott  YOB: 1955    MRN: [id number]     Acct:        Pt seen and Chart reviewed. Mr/Ms Ramon Scott is here in followup for    Diagnosis Orders   1. Moderate persistent asthma without complication     2. JOJO (obstructive sleep apnea)     3. LIP (lymphoid interstitial pneumonitis) ([facility] [address]     4. Allergic rhinitis, unspecified seasonality, unspecified trigger     5. COVID-19 virus infection       She is today for follow-up. This virtual visit was rearranged initially as a face-to-face visit because of her symptoms. According to patient she had upper respiratory flulike symptoms started 2 weeks ago when she was having headache fever tiredness she did have nasal congestion and rhinorrhea and also was complaining of increased cough but did not have much change in her breathing she was able to do her regular activities. She had Covid testing done at home which was positive she confirm it with urgent care but she did not have to visit urgent care or ER was not diagnosed with pneumonia did not require oxygen and did not have hypoxia although she did not have any chest x-ray done. Her symptoms gradually improved and she is feeling better according to patient she was on quarantine for 14 days and she is out of quarantine now. During that period the time she was requiring more albuterol. Her baseline symptoms were otherwise a stable until 2 weeks ago when she started having upper respiratory/Covid symptoms. Usually she does not get daily cough daily sputum production daily wheezing. Her cough is usually intermittent and mild. She does not complain of postnasal drip and nasal congestion except occasionally and she was not having wheezing.   She did not have nocturnal awakening with cough wheezing chest tightness or shortness of breath  She did not have a sputum production change in color of the sputum volume of the sputum. She did not have orthopnea or PND she does have chronic pedal edema without much change. Currently she is not complaining of fever no hemoptysis chest pain or loss of appetite and shortness of breath. She is taking Advair twice daily currently she is not requiring albuterol. Her postnasal drip symptoms are stable she is on Flonase and she takes Zyrtec. She is followed by rheumatology for rheumatoid arthritis she is on [place] and she is currently not on any steroid therapy. Her symptoms of rheumatoid arthritis with morning stiffness and joint pain are under control. She had not been using her CPAP she did have history of intolerance/noncompliance with CPAP had not been using CPAP for 5 to 6 years now. She denies significant symptoms of sleep apnea    Previous work-up in 2021  Last HR CT of the chest shows mild areas of scattered minimal linear fibrotic change with occasional area of bronchiectasis no honeycomb changes minimal area of pleural-based groundglass area. This finding is no change since high-resolution CT of the chest since 2017 per radiology. (Images reviewed from both CT scan). Her pulmonary function test on 07/06/2021 is consistent with moderate restrictive ventilatory impairment her total lung capacity and FVC is only slightly reduced almost stable as compared to 2018 surprisingly her diffusion capacity was reported as normal although it had been moderately reduced previously. Last CXR is done on 01/03/20 and showed no acute or chronic changes and no change from 2015 CXR.       Previous history/previous course  She has h/o Unspecified CTD/RA, ILD which was diagnosed to be LIP on open lung biopsy done many years ago and mild reduction in lung volumes and dlco and has stable PFTs and stable symptoms until last visit when there was decline in FEV1 and FVC, lung volumes and DLCO and she was scheduled  for HRCT Chest and ECHO, her high-resolution CT of the chest last time did not show active alveolitis, groundglass changes or significant fibrotic changes, her echo shows normal LV function but was difficult to estimated RV systolic pressure which shows normal RV size and function and she was instructed that she needs Rheumatology     In the past she was on prednisone then plaquenil and then Ariva and then she was stopped by her rheumatology and she was not seen by Dr Alicia Brush for some time. She has h/o JOJO diagnosed severe JOJO in 2005 but she is not using cpap for some times now and still non compliant with cpap and denies symptoms of EDS, and daytime naps and unrefresh sleep        Subjective:   Review of Systems -   General ROS: Negative for chills sometimes, negative for tiredness, negative for fever, hot flashes, night sweats, weight gain or weight loss  ENT ROS: Positive for nasal congestion, postnasal dripping, nasal congestion negative for - epistaxis, nasal polyps, oral lesions or sneezing  Allergy and Immunology ROS: Positive for - nasal congestion, mild postnasal drip or seasonal allergies  Respiratory ROS: Negative for - cough, wheezing and sputum production, negative for hemoptysis, orthopnea, stridor, tachypnea. Cardiovascular ROS: negative for - chest pain, dyspnea on exertion, edema, loss of consciousness, orthopnea or paroxysmal nocturnal dyspnea  Gastrointestinal ROS: no abdominal pain, change in bowel habits, or black or bloody stools  Musculoskeletal ROS: Positive for joints pain and stiffness, negative for joint swelling   Genitourinary: Negative for hematuria or dysuria frequency and nocturia. CNS negative for dizziness vertigo weakness numbness tingling, syncope, headache, seizure. Skin negative for rash and a skin lesion.   Hematology oncology negative for easy bleeding and bruising and petechia    Sleep Medicine 9/8/2021 5/12/2021 3/9/2021 9/12/2017 3/7/2017   Sitting and reading 0 0 0 0 0   Watching TV 0 0 1 0 1 Sitting, inactive in a public place (e.g. a theatre or a meeting) 0 0 0 0 0   As a passenger in a car for an hour without a break 0 0 0 0 0   Lying down to rest in the afternoon when circumstances permit 0 0 3 1 0   Sitting and talking to someone 0 0 0 0 0   Sitting quietly after a lunch without alcohol 0 0 0 0 0   In a car, while stopped for a few minutes in traffic 0 0 0 0 0   Total score 0 0 4 1 1       Allergies: Allergies   Allergen Reactions    Nubain [Nalbuphine Hcl]     Penicillins        Medications:  Outpatient Encounter Medications as of 3/25/2022   Medication Sig Dispense Refill    Semaglutide (RYBELSUS) 7 MG TABS Take by mouth daily      rosuvastatin (CRESTOR) 20 MG tablet Take 20 mg by mouth daily      vitamin D (ERGOCALCIFEROL) 1.25 MG (76697 UT) CAPS capsule Take 50,000 Units by mouth once a week      albuterol sulfate HFA (VENTOLIN HFA) 108 (90 Base) MCG/ACT inhaler Inhale 2 puffs into the lungs every 6 hours as needed for Wheezing or Shortness of Breath 1 Inhaler 5    ibuprofen (ADVIL;MOTRIN) 800 MG tablet       Tofacitinib Citrate (XELJANZ PO) Take by mouth daily      predniSONE (DELTASONE) 1 MG tablet Take 1 mg by mouth daily      amLODIPine (NORVASC) 10 MG tablet Take 10 mg by mouth daily      nebivolol (BYSTOLIC) 10 MG tablet Take  by mouth daily.  fluticasone-salmeterol (ADVAIR DISKUS) 250-50 MCG/DOSE AEPB Inhale 1 puff into the lungs 2 times daily 3 Inhaler 3    predniSONE (DELTASONE) 10 MG tablet Take 4 tablet once daily for 3 days then 3 tablet once daily for 3 days then 2 tablet once daily for 3 days then 1 tablet once daily for 3 days (Patient not taking: Reported on 9/8/2021) 30 tablet 0    Baricitinib (OLUMIANT) 2 MG TABS Take 2 mg by mouth daily (Patient not taking: Reported on 9/8/2021)       No facility-administered encounter medications on file as of 3/25/2022. Objective:    Physical Exam:  Vitals: There were no vitals taken for this visit.   Last 3 weights: Wt Readings from Last 3 Encounters:   09/08/21 253 lb (114.8 kg)   07/06/21 250 lb (113.4 kg)   03/18/20 255 lb (115.7 kg)     There is no height or weight on file to calculate BMI. Physical Examination: Virtual visual exam  General appearance - alert, well appearing, and in no distress  Mental status - alert, oriented to person, place, and time  Eyes -extraocular eye movements intact  Ears -not examined  Nose -not examined  Mouth -not examined  Neck - Short and thick neck  Chest -visually no nasal flaring no distress noted  Heart -not examined  Abdomen -not examined  Neurological - alert, oriented, normal speech  Extremities -not examined  Skin -not examined        Labs:  None    CBC:   WBC   Date Value Ref Range Status   04/12/2021 13.3 (H) 3.5 - 11.3 k/uL Final     Hemoglobin   Date Value Ref Range Status   04/12/2021 13.2 11.9 - 15.1 g/dL Final     Platelets   Date Value Ref Range Status   04/12/2021 See Reflexed IPF Result 138 - 453 k/uL Final     BMP:   Sodium   Date Value Ref Range Status   06/04/2015 144 135 - 144 mmol/L Final     Potassium   Date Value Ref Range Status   06/04/2015 4.5 3.7 - 5.3 mmol/L Final     Chloride   Date Value Ref Range Status   06/04/2015 103 98 - 107 mmol/L Final     CO2   Date Value Ref Range Status   06/04/2015 25 20 - 31 mmol/L Final     BUN   Date Value Ref Range Status   06/04/2015 11 6 - 20 mg/dL Final     CREATININE   Date Value Ref Range Status   06/04/2015 0.50 0.50 - 0.90 mg/dL Final   05/03/2014 0.48 (L) 0.50 - 0.90 mg/dL Final     Glucose   Date Value Ref Range Status   06/04/2015 75 70 - 99 mg/dL Final     S. Calcium:   Calcium   Date Value Ref Range Status   06/04/2015 9.1 8.6 - 10.4 mg/dL Final     S.  Ionized Calcium: No results found for: IONCA  S. Magnesium: No results found for: MG  S. Phosphorus: No results found for: PHOS  S. Glucose: No results found for: POCGLU  Glycosylated hemoglobin A1C: No results found for: LABA1C    Pulmonary Functions Testing Results:    7/06/21: FEV1 1.59 65%, FVC 1.92 61%, FEV1 %, TLC 3.16 61%, DLCO 19.18 88%  6/12/18: FEV1 1.75 69%, FVC 2.08 65%, FEV1/%, TLC 3.29 63%, DLCO 11.27 51%  9/12/17: FEV1 1.71 57%, FVC 2.08 54%, TCM3RYP 105%, TLC 3.24 53%, DLCO 11.46 41%  1/19/16: FEV1 1.86 79%, FVC 2.27 72%, VWX2YHG 110%, TLC 3.37 67%, DLCO 12.82 62%     Blood Gases: No results found for: PH, PCO2, PO2, HCO3, O2SAT    Radiological reports:    CXR    04/12/2021. Slightly increased perihilar markings when compared to previous particularly   in the infrahilar region.  Definite focal infiltrate not seen.  This could   represent bronchitis or bronchiolitis. 01/03/2020  The mediastinal and cardiac contours are normal.  There is no focal    consolidation, pleural effusion or pneumothorax.  Scarring in the left upper    lobe is unchanged. CT Scans    HRCT Chest     10/11/17  Stable appearing HRCT with no acute process and redemonstration of few   nonspecific 3-4 mm pulmonary nodules as well as left lung scarring. 1/16/16  No significant ground glass opacities, intralobular septal thickening/interstitial opacities, centrilobular nodules, emphysematous changes, bronchiectasis, fibrosis/honeycombing.  No pleural effusion. Stable areas of scarring are again noted in    the left lung. Stable 3 mm peripheral right upper lobe nodule on series 4 image 21. Stable pleural-based posterior left lower lobe nodule measuring 3-4 mm on image 32. Prior cholecystectomy. No significant osseous abnormality with mild hypertrophic    degenerative changes in thoracic spine. EKG:   ECHO: 10/11/2017  Technically difficult study due to patients body habitus. Left ventricle is normal in size. Global left ventricular systolic function  is normal. Estimated ejection fraction is 55 % . Difficult to estimate right ventricular systolic pressure due to body  habitus. Normal right ventricular size and function.   No significant pericardial effusion is seen. Assessment:    ICD-10-CM    1. Moderate persistent asthma without complication  K91.22    2. JOJO (obstructive sleep apnea)  G47.33    3. LIP (lymphoid interstitial pneumonitis) ([facility])  J84.2    4. Allergic rhinitis, unspecified seasonality, unspecified trigger  J30.9    5. COVID-19 virus infection  U07.1        Last high-resolution CT of the chest as mentioned above showed mild areas of scattered minimal linear fibrotic change with occasional area of bronchiectasis, no honeycomb changes minimal area of pleural-based groundglass area. This finding is no change since high-resolution CT of the chest since 2017 per radiology. (Images reviewed from both CT scan). Lung nodule has not changed either since 2017  Her pulmonary function test on 07/06/2021 is consistent with moderate restrictive ventilatory impairment her total lung capacity and FVC is only slightly reduced almost stable as compared to 2018 surprisingly her diffusion capacity was reported as normal although it had been moderately reduced previously. She is being followed by rheumatology and she is on [place] and off prednisone. Plan:     Her upper respiratory symptoms due to COVID-19 infection is much improved  Continue  Advair 250/50 1 puff twice daily  Advised to avoid allergens and triggers   Albuterol as needed  Flonase daily and Zyrtec to continue  Follow up Rheumatology and if needed prednisone per rheumatology  She had both the dose of Covid vaccine. Booster dose of COVID recommended  Recommend flu vaccine annually in fall. She does not get flu vaccine  Recommend Pneumonia vaccine  Maintain an active lifestyle     Encourage CPAP use, discussed with her on past visit and she does not want to try CPAP again had not use it for 5-6 years or more and denies much daytime symptoms.     Wt loss is recommended and discussed  Follow good sleep hygeine instructions  Questions answered pertaining to diagnosis and management explained importance of compliance with therapy. RTC in 6 months    Please note that this chart was generated using voice recognition Dragon dictation software. Although every effort was made to ensure the accuracy of this automated transcription, some errors in transcription may have occurred. Ulysses Miranda MD, MD             3/25/2022, 4:43 PM       Juliet Keenan is a 61 y.o. female being evaluated by a Virtual Visit (video/telephone visit) encounter to address concerns as mentioned above. A caregiver was present when appropriate. Due to this being a TeleHealth encounter (During [facility]- public health emergency), evaluation of the following organ systems was limited: Vitals/Constitutional/EENT/Resp/CV/GI//MS/Neuro/Skin/Heme-Lymph-Imm. Pursuant to the emergency declaration under the [address] [place] [address], [address] [place] [address] authority and the [employer] and [employer] Act, this Virtual Visit was conducted with patient's (and/or legal guardian's) consent, to reduce the patient's risk of exposure to COVID-19 and provide necessary medical care. The patient (and/or legal guardian) has also been advised to contact this office for worsening conditions or problems, and seek emergency medical treatment and/or call 911 if deemed necessary. Patient identification was verified at the start of the visit: Yes  Total time spent for this encounter: Not billed by time  Services were provided through a video/telephone synchronous discussion virtually to substitute for in-person clinic visit. Patient and provider were located at their individual locations at home and office respectively. --Ulysses Miranda MD on 3/25/2022 at 4:46 PM    An electronic signature was used to authenticate this note.

## 2022-04-22 ENCOUNTER — TELEPHONE (OUTPATIENT)
Dept: PULMONOLOGY | Age: 67
End: 2022-04-22

## 2022-04-22 DIAGNOSIS — J20.9 ACUTE BRONCHITIS, UNSPECIFIED ORGANISM: Primary | ICD-10-CM

## 2022-04-22 RX ORDER — AZITHROMYCIN 250 MG/1
250 TABLET, FILM COATED ORAL SEE ADMIN INSTRUCTIONS
Qty: 6 TABLET | Refills: 0 | Status: SHIPPED | OUTPATIENT
Start: 2022-04-22 | End: 2022-04-27

## 2022-04-22 RX ORDER — PREDNISONE 20 MG/1
40 TABLET ORAL DAILY
Qty: 10 TABLET | Refills: 0 | Status: SHIPPED | OUTPATIENT
Start: 2022-04-22 | End: 2022-04-27

## 2022-04-22 NOTE — TELEPHONE ENCOUNTER
I wrote a prescription for Zithromax pack and 5 days of prednisone in epic.   If she does not get better or she gets worse then she has to go to emergency room

## 2022-05-09 ENCOUNTER — TELEPHONE (OUTPATIENT)
Dept: PULMONOLOGY | Age: 67
End: 2022-05-09

## 2022-05-09 DIAGNOSIS — J20.9 ACUTE BRONCHITIS, UNSPECIFIED ORGANISM: Primary | ICD-10-CM

## 2022-05-09 RX ORDER — FLUTICASONE PROPIONATE 50 MCG
2 SPRAY, SUSPENSION (ML) NASAL DAILY
Qty: 48 G | Refills: 1 | Status: SHIPPED | OUTPATIENT
Start: 2022-05-09

## 2022-05-09 RX ORDER — LEVOFLOXACIN 500 MG/1
500 TABLET, FILM COATED ORAL DAILY
Qty: 10 TABLET | Refills: 0 | Status: SHIPPED | OUTPATIENT
Start: 2022-05-09 | End: 2022-05-19

## 2022-05-09 NOTE — TELEPHONE ENCOUNTER
Pateint called, just finished an antibiotic and steroid. Still complains of a constant cough and a lot of post nasal drip. She is not using a nasal spray at this time. Her cough is sometimes a little bit of yellow still. How do you want her to proceed? Pharmacy and allergies verified.

## 2022-05-09 NOTE — TELEPHONE ENCOUNTER
Dr would like a chest xray and either have pt come into the office tomorrow, or he will prescribe Levofloxacin. Spoke to the patient, she has a work meeeting tomorrow morning at [employer] that she cannot miss. She is aware to get the chest xray done and is willing to try Levofloxacin.

## 2022-05-09 NOTE — TELEPHONE ENCOUNTER
I sent the prescription for levofloxacin. I also sent the prescription for Flonase please tell the patient she need to start using Flonase.   Also she should use [place] pot which is over-the-counter that will help her sinuses

## 2022-05-11 ENCOUNTER — HOSPITAL ENCOUNTER (OUTPATIENT)
Age: 67
Discharge: HOME | End: 2022-05-13
Payer: COMMERCIAL

## 2022-05-11 ENCOUNTER — HOSPITAL ENCOUNTER (OUTPATIENT)
Dept: GENERAL RADIOLOGY | Age: 67
Discharge: HOME | End: 2022-05-13
Payer: COMMERCIAL

## 2022-05-11 DIAGNOSIS — J20.9 ACUTE BRONCHITIS, UNSPECIFIED ORGANISM: ICD-10-CM

## 2022-05-11 PROCEDURE — 71046 X-RAY EXAM CHEST 2 VIEWS: CPT

## 2022-05-21 ENCOUNTER — HOSPITAL ENCOUNTER (EMERGENCY)
Age: 67
Discharge: HOME | End: 2022-05-21
Attending: EMERGENCY MEDICINE
Payer: COMMERCIAL

## 2022-05-21 ENCOUNTER — APPOINTMENT (OUTPATIENT)
Dept: GENERAL RADIOLOGY | Age: 67
End: 2022-05-21
Payer: COMMERCIAL

## 2022-05-21 VITALS
HEART RATE: 83 BPM | SYSTOLIC BLOOD PRESSURE: 144 MMHG | BODY MASS INDEX: 36.29 KG/M2 | DIASTOLIC BLOOD PRESSURE: 81 MMHG | RESPIRATION RATE: 18 BRPM | TEMPERATURE: 99 F | OXYGEN SATURATION: 97 % | WEIGHT: 245 LBS | HEIGHT: 69 IN

## 2022-05-21 DIAGNOSIS — L03.119 CELLULITIS OF LOWER EXTREMITY, UNSPECIFIED LATERALITY: Primary | ICD-10-CM

## 2022-05-21 LAB
ABSOLUTE EOS #: 0.3 K/UL (ref 0–0.44)
ABSOLUTE IMMATURE GRANULOCYTE: 0.08 K/UL (ref 0–0.3)
ABSOLUTE LYMPH #: 1.34 K/UL (ref 1.1–3.7)
ABSOLUTE MONO #: 0.83 K/UL (ref 0.1–1.2)
ANION GAP SERPL CALCULATED.3IONS-SCNC: 10 MMOL/L (ref 9–17)
BASOPHILS # BLD: 1 % (ref 0–2)
BASOPHILS ABSOLUTE: 0.06 K/UL (ref 0–0.2)
BUN BLDV-MCNC: 8 MG/DL (ref 8–23)
BUN/CREAT BLD: 16 (ref 9–20)
C-REACTIVE PROTEIN: 23.2 MG/L (ref 0–5)
CALCIUM SERPL-MCNC: 9.2 MG/DL (ref 8.6–10.4)
CHLORIDE BLD-SCNC: 100 MMOL/L (ref 98–107)
CO2: 28 MMOL/L (ref 20–31)
CREAT SERPL-MCNC: 0.5 MG/DL (ref 0.5–0.9)
D-DIMER QUANTITATIVE: 4.95 MG/L FEU (ref 0–0.59)
EOSINOPHILS RELATIVE PERCENT: 3 % (ref 1–4)
GFR AFRICAN AMERICAN: >60 ML/MIN
GFR NON-AFRICAN AMERICAN: >60 ML/MIN
GFR SERPL CREATININE-BSD FRML MDRD: ABNORMAL ML/MIN/{1.73_M2}
GLUCOSE BLD-MCNC: 234 MG/DL (ref 70–99)
HCT VFR BLD CALC: 42.6 % (ref 36.3–47.1)
HEMOGLOBIN: 13.7 G/DL (ref 11.9–15.1)
IMMATURE GRANULOCYTES: 1 %
LYMPHOCYTES # BLD: 14 % (ref 24–43)
MCH RBC QN AUTO: 30.2 PG (ref 25.2–33.5)
MCHC RBC AUTO-ENTMCNC: 32.2 G/DL (ref 28.4–34.8)
MCV RBC AUTO: 94 FL (ref 82.6–102.9)
MONOCYTES # BLD: 9 % (ref 3–12)
MYOGLOBIN: 30 NG/ML (ref 25–58)
NRBC AUTOMATED: 0 PER 100 WBC
PDW BLD-RTO: 12.6 % (ref 11.8–14.4)
PLATELET # BLD: 252 K/UL (ref 138–453)
PMV BLD AUTO: 9.3 FL (ref 8.1–13.5)
POTASSIUM SERPL-SCNC: 3.9 MMOL/L (ref 3.7–5.3)
RBC # BLD: 4.53 M/UL (ref 3.95–5.11)
SEDIMENTATION RATE, ERYTHROCYTE: 35 MM/HR (ref 0–30)
SEG NEUTROPHILS: 72 % (ref 36–65)
SEGMENTED NEUTROPHILS ABSOLUTE COUNT: 6.71 K/UL (ref 1.5–8.1)
SODIUM BLD-SCNC: 138 MMOL/L (ref 135–144)
TOTAL CK: 120 U/L (ref 26–192)
WBC # BLD: 9.3 K/UL (ref 3.5–11.3)

## 2022-05-21 PROCEDURE — 85652 RBC SED RATE AUTOMATED: CPT

## 2022-05-21 PROCEDURE — 99284 EMERGENCY DEPT VISIT MOD MDM: CPT

## 2022-05-21 PROCEDURE — 80048 BASIC METABOLIC PNL TOTAL CA: CPT

## 2022-05-21 PROCEDURE — 6360000002 HC RX W HCPCS: Performed by: EMERGENCY MEDICINE

## 2022-05-21 PROCEDURE — 82550 ASSAY OF CK (CPK): CPT

## 2022-05-21 PROCEDURE — 93970 EXTREMITY STUDY: CPT

## 2022-05-21 PROCEDURE — 85025 COMPLETE CBC W/AUTO DIFF WBC: CPT

## 2022-05-21 PROCEDURE — 83874 ASSAY OF MYOGLOBIN: CPT

## 2022-05-21 PROCEDURE — 96374 THER/PROPH/DIAG INJ IV PUSH: CPT

## 2022-05-21 PROCEDURE — 86140 C-REACTIVE PROTEIN: CPT

## 2022-05-21 PROCEDURE — 85379 FIBRIN DEGRADATION QUANT: CPT

## 2022-05-21 PROCEDURE — 73562 X-RAY EXAM OF KNEE 3: CPT

## 2022-05-21 RX ORDER — ACETAMINOPHEN 500 MG
1000 TABLET ORAL EVERY 8 HOURS PRN
Qty: 30 TABLET | Refills: 0 | Status: SHIPPED | OUTPATIENT
Start: 2022-05-21

## 2022-05-21 RX ORDER — SULFAMETHOXAZOLE AND TRIMETHOPRIM 800; 160 MG/1; MG/1
1 TABLET ORAL 2 TIMES DAILY
Qty: 14 TABLET | Refills: 0 | Status: SHIPPED | OUTPATIENT
Start: 2022-05-21 | End: 2022-05-28

## 2022-05-21 RX ORDER — NAPROXEN 500 MG/1
500 TABLET ORAL 2 TIMES DAILY PRN
Qty: 20 TABLET | Refills: 0 | Status: SHIPPED | OUTPATIENT
Start: 2022-05-21

## 2022-05-21 RX ORDER — KETOROLAC TROMETHAMINE 15 MG/ML
15 INJECTION, SOLUTION INTRAMUSCULAR; INTRAVENOUS ONCE
Status: DISCONTINUED | OUTPATIENT
Start: 2022-05-21 | End: 2022-05-21

## 2022-05-21 RX ORDER — KETOROLAC TROMETHAMINE 15 MG/ML
15 INJECTION, SOLUTION INTRAMUSCULAR; INTRAVENOUS ONCE
Status: COMPLETED | OUTPATIENT
Start: 2022-05-21 | End: 2022-05-21

## 2022-05-21 RX ADMIN — KETOROLAC TROMETHAMINE 15 MG: 15 INJECTION, SOLUTION INTRAMUSCULAR; INTRAVENOUS at 17:04

## 2022-05-21 ASSESSMENT — ENCOUNTER SYMPTOMS: COLOR CHANGE: 1

## 2022-05-21 ASSESSMENT — PAIN SCALES - GENERAL: PAINLEVEL_OUTOF10: 10

## 2022-05-21 ASSESSMENT — PAIN DESCRIPTION - LOCATION: LOCATION: LEG

## 2022-05-21 ASSESSMENT — PAIN DESCRIPTION - DESCRIPTORS: DESCRIPTORS: TIGHTNESS;TINGLING

## 2022-05-21 ASSESSMENT — PAIN - FUNCTIONAL ASSESSMENT: PAIN_FUNCTIONAL_ASSESSMENT: 0-10

## 2022-05-21 ASSESSMENT — PAIN DESCRIPTION - PAIN TYPE: TYPE: ACUTE PAIN

## 2022-05-21 ASSESSMENT — PAIN DESCRIPTION - ORIENTATION: ORIENTATION: LEFT

## 2022-05-21 ASSESSMENT — PAIN DESCRIPTION - FREQUENCY: FREQUENCY: CONTINUOUS

## 2022-05-21 NOTE — ED PROVIDER NOTES
[facility]  Emergency Department Encounter     Pt Name: Briana Christina  MRN: [id number]  [place] 1955  Date of evaluation: 5/21/22  PCP:  Scott Fall MD    [address] [place] [address]       Chief Complaint   Patient presents with    Leg Pain     c/o left leg swelling with pain, PCP told her to be seen at ED for possible blood clot, warm to touch, hx of DVT's    Leg Swelling       HISTORY OF PRESENT ILLNESS  (Location/Symptom, Timing/Onset, Context/Setting, Quality, Duration, Modifying Factors, Severity.)    Briana Christina is a 74 y.o. female who presents with what initially started yesterday as left knee pain, however when she woke up this morning she noticed that her left leg was swollen and is now having redness to her bilateral lower extremities. She does have a history of left knee replacement in the past.  However there has been no falls trips slips or reinjury to the area. She does have a history of rheumatoid arthritis. She has a history of DVTs in the past and is concerned that this may be what is causing her symptoms. She states the last time she had a DVT it was after she had her hysterectomy. She is no longer on any anticoagulation medication. No recent travel or recent surgery, exogenous hormones, history of PE, history of cancer. She has not had any fevers chills sweats or body aches. She is diabetic but does not suffer from neuropathy. PAST MEDICAL / SURGICAL / SOCIAL / FAMILY HISTORY    has a past medical history of Abnormal PFT, Allergic rhinitis due to allergen, Asthma, Chronic cough, Connective tissue disorder (HCC), Dyspnea, HTN (hypertension), ILD (interstitial lung disease) ([address] LIP (lymphoid interstitial pneumonitis) ([address] Lung nodules, Lupus ([address] JOJO (obstructive sleep apnea), and Rheumatoid arthritis ([address] has a past surgical history that includes Wrist surgery (Right); knee surgery (Left); and Cholecystectomy.     Social Medication Sig Start Date End Date Taking?  Authorizing Provider   sulfamethoxazole-trimethoprim (BACTRIM DS) 800-160 MG per tablet Take 1 tablet by mouth 2 times daily for 7 days 5/21/22 5/28/22 Yes Olivia Devi DO   naproxen (NAPROSYN) 500 MG tablet Take 1 tablet by mouth 2 times daily as needed for Pain 5/21/22  Yes Phill Kirkland,    acetaminophen (TYLENOL) 500 MG tablet Take 2 tablets by mouth every 8 hours as needed for Pain 5/21/22  Yes Olivia Devi DO   fluticasone (FLONASE) 50 MCG/ACT nasal spray 2 sprays by Each Nostril route daily 5/9/22   Amanda Pickens MD   Semaglutide (RYBELSUS) 7 MG TABS Take by mouth daily    Historical Provider, MD   rosuvastatin (CRESTOR) 20 MG tablet Take 20 mg by mouth daily    Historical Provider, MD   vitamin D (ERGOCALCIFEROL) 1.25 MG (08980 UT) CAPS capsule Take 50,000 Units by mouth once a week    Historical Provider, MD   fluticasone-salmeterol (ADVAIR DISKUS) 250-50 MCG/DOSE AEPB Inhale 1 puff into the lungs 2 times daily 5/12/21 9/8/21  Amanda Pickens MD   albuterol sulfate HFA (VENTOLIN HFA) 108 (90 Base) MCG/ACT inhaler Inhale 2 puffs into the lungs every 6 hours as needed for Wheezing or Shortness of Breath 5/12/21   Amanda Pickens MD   predniSONE (DELTASONE) 10 MG tablet Take 4 tablet once daily for 3 days then 3 tablet once daily for 3 days then 2 tablet once daily for 3 days then 1 tablet once daily for 3 days  Patient not taking: Reported on 9/8/2021 4/9/21   Amanda Pickens MD   ibuprofen (ADVIL;MOTRIN) 800 MG tablet  2/18/21 5/21/22  Historical Provider, MD   Tofacitinib Citrate (XELJANZ PO) Take by mouth daily    Historical Provider, MD   Baricitinib (OLUMIANT) 2 MG TABS Take 2 mg by mouth daily  Patient not taking: Reported on 9/8/2021    Historical Provider, MD   predniSONE (DELTASONE) 1 MG tablet Take 1 mg by mouth daily    Historical Provider, MD   amLODIPine (NORVASC) 10 MG tablet Take 10 mg by mouth daily    Historical Provider, MD   nebivolol (BYSTOLIC) 10 MG tablet Take  by mouth daily. Historical Provider, MD       REVIEW OF SYSTEMS    (2-9 systems for level 4, 10 or more for level 5)    Review of Systems   Constitutional: Negative for chills, diaphoresis and fever. Cardiovascular: Positive for leg swelling. Musculoskeletal: Positive for arthralgias, gait problem and myalgias. Skin: Positive for color change and rash. Allergic/Immunologic: Negative for immunocompromised state. Neurological: Negative for weakness and numbness. Hematological: Does not bruise/bleed easily. PHYSICAL EXAM   (up to 7 for level 4, 8 or more for level 5)    VITALS:   Vitals:    05/21/22 1353   BP: (!) 144/81   Pulse: 83   Resp: 18   Temp: 99 °F (37.2 °C)   TempSrc: Oral   SpO2: 97%   Weight: 245 lb (111.1 kg)   Height: 5' 9\" (1.753 m)       Physical Exam  Vitals and nursing note reviewed. Constitutional:       General: She is not in acute distress. Appearance: She is well-developed. She is obese. She is not diaphoretic. HENT:      Head: Normocephalic and atraumatic. Eyes:      Conjunctiva/sclera: Conjunctivae normal.   Cardiovascular:      Rate and Rhythm: Normal rate and regular rhythm. Pulses: Normal pulses. Heart sounds: Normal heart sounds. Pulmonary:      Effort: Pulmonary effort is normal. No respiratory distress. Musculoskeletal:         General: Tenderness present. Normal range of motion. Cervical back: Normal range of motion. Right lower leg: Edema present. Left lower leg: Edema present. Comments: No L knee effusion, swelling, erythema, or warmth. BLLE warmth and erythema mid shin down   Skin:     General: Skin is warm and dry. Capillary Refill: Capillary refill takes less than 2 seconds. Findings: Erythema present. Neurological:      General: No focal deficit present. Mental Status: She is alert.    Psychiatric:         Behavior: Behavior normal.         DIFFERENTIAL  DIAGNOSIS   PLAN (LABS / IMAGING / EKG):  Orders Placed This Encounter   Procedures    XR KNEE LEFT (3 VIEWS)    VL DUP LOWER EXTREMITY VENOUS BILATERAL    CBC with Auto Differential    Basic Metabolic Panel    CK    Myoglobin    C-Reactive Protein    Sedimentation Rate    D-Dimer, Quantitative       MEDICATIONS ORDERED:  Orders Placed This Encounter   Medications    DISCONTD: ketorolac (TORADOL) injection 15 mg    ketorolac (TORADOL) injection 15 mg    sulfamethoxazole-trimethoprim (BACTRIM DS) 800-160 MG per tablet     Sig: Take 1 tablet by mouth 2 times daily for 7 days     Dispense:  14 tablet     Refill:  0    naproxen (NAPROSYN) 500 MG tablet     Sig: Take 1 tablet by mouth 2 times daily as needed for Pain     Dispense:  20 tablet     Refill:  0    acetaminophen (TYLENOL) 500 MG tablet     Sig: Take 2 tablets by mouth every 8 hours as needed for Pain     Dispense:  30 tablet     Refill:  0     DIAGNOSTIC RESULTS / EMERGENCYDEPARTMENT COURSE / MDM   LABS:  Labs Reviewed   CBC WITH AUTO DIFFERENTIAL - Abnormal; Notable for the following components:       Result Value    Seg Neutrophils 72 (*)     Lymphocytes 14 (*)     Immature Granulocytes 1 (*)     All other components within normal limits   BASIC METABOLIC PANEL - Abnormal; Notable for the following components:    Glucose 234 (*)     All other components within normal limits   C-REACTIVE PROTEIN - Abnormal; Notable for the following components:    CRP 23.2 (*)     All other components within normal limits   SEDIMENTATION RATE - Abnormal; Notable for the following components:    Sed Rate 35 (*)     All other components within normal limits   D-DIMER, QUANTITATIVE - Abnormal; Notable for the following components:    D-Dimer, Quant 4.95 (*)     All other components within normal limits   CK   MYOGLOBIN, SERUM       RADIOLOGY:  XR KNEE LEFT (3 VIEWS)    Result Date: 5/21/2022  EXAMINATION: THREE XRAY VIEWS OF THE LEFT KNEE 5/21/2022 3:45 pm COMPARISON: April 12, 2011 HISTORY: ORDERING SYSTEM PROVIDED HISTORY: prior replacement, pain, swelling TECHNOLOGIST PROVIDED HISTORY: prior replacement, pain, swelling Reason for Exam: Pt states anterior left knee pain x 1 week with bilateral lower leg redness and swelling, no known injury FINDINGS: No acute osseous abnormality. Total left knee arthroplasty without evidence of hardware failure or loosening. Alignment anatomic. No significant joint effusion. Soft tissues unremarkable. No acute findings. EMERGENCY DEPARTMENT COURSE:  ED Course as of 05/21/22 1717   Sat May 21, 2022   1603 CBC with Auto Differential(!):    WBC 9.3   RBC 4.53   Hemoglobin Quant 13.7   Hematocrit 42.6   MCV 94.0   MCH 30.2   MCHC 32.2   RDW 12.6   Platelet Count 203   MPV 9.3   NRBC Automated 0.0   Seg Neutrophils 72(!)   Lymphocytes 14(!)   Monocytes 9   Eosinophils % 3   Basophils 1   Immature Granulocytes 1(!)   Segs Absolute 6.71   Absolute Lymph # 1.34   Absolute Mono # 0.83   Absolute Eos # 0.30   Basophils Absolute 0.06   Absolute Immature Granulocyte 0.08 [AO]   1603 XR KNEE LEFT (3 VIEWS) [AO]   1618 C-Reactive Protein(!):    CRP 23.2(!) [AO]   1618 Myoglobin:    Myoglobin 30 [AO]   1619 D-Dimer, Quantitative(!):    D-Dimer, Quant 4.95(!) [AO]   0256 Basic Metabolic Panel(!):    GLUCOSE, FASTING,(!)   BUN,BUNPL 8   Creatinine 0.50   Bun/Cre Ratio 16   CALCIUM, SERUM, 293230 9.2   Sodium 138   Potassium 3.9   Chloride 100   CO2 28   Anion Gap 10   GFR Non- >60   GFR  >60   GFR Comment      [AO]   1624 CK:     Total  [AO]   1642 Sed Rate(!): 35 [AO]   1714 Doppler negative for DVT [AO]      ED Course User Index  [AO] Bebeto Abrams Humphries 1721, DO       MDM  Number of Diagnoses or Management Options     Amount and/or Complexity of Data Reviewed  Clinical lab tests: ordered and reviewed  Tests in the radiology section of CPT®: ordered and reviewed  Review and summarize past medical records: yes  Independent visualization of images, tracings, or specimens: yes    Patient Progress  Patient progress: stable      PROCEDURES:  Procedures     CONSULTS:  None    CRITICAL CARE:  NONE    FINAL IMPRESSION     1. Cellulitis of lower extremity, unspecified laterality       DISPOSITION / PLAN   DISPOSITION Decision To Discharge 05/21/2022 05:15:36 PM      Evaluation and treatment course in the ED, and plan of care upon discharge was discussed in length with the patient. Patient had no further questions prior to being discharged and was instructed to return to the ED for new or worsening symptoms. Any changes to existing medications or new prescriptions were reviewed with patient and they expressed understanding of how to correctly take their medications and the possible side effects. PATIENT REFERRED TO:  Ailyn Duque MD  [address] [place] [address] ED  [facility]  [phone]    As needed, If symptoms worsen      DISCHARGE MEDICATIONS:  New Prescriptions    ACETAMINOPHEN (TYLENOL) 500 MG TABLET    Take 2 tablets by mouth every 8 hours as needed for Pain    NAPROXEN (NAPROSYN) 500 MG TABLET    Take 1 tablet by mouth 2 times daily as needed for Pain    SULFAMETHOXAZOLE-TRIMETHOPRIM (BACTRIM DS) 800-160 MG PER TABLET    Take 1 tablet by mouth 2 times daily for 7 days       Olivia Henriquez DO  Emergency Medicine Physician    (Please note that portions of this note were completed with a voice recognition program.  Efforts were made to edit the dictations but occasionally words are mis-transcribed.)        Bebeto Humphries 1727,   05/21/22 8074

## 2023-12-26 ENCOUNTER — APPOINTMENT (OUTPATIENT)
Dept: GENERAL RADIOLOGY | Age: 68
End: 2023-12-26
Payer: MEDICARE

## 2023-12-26 ENCOUNTER — HOSPITAL ENCOUNTER (EMERGENCY)
Age: 68
Discharge: HOME | End: 2023-12-26
Attending: EMERGENCY MEDICINE
Payer: MEDICARE

## 2023-12-26 ENCOUNTER — APPOINTMENT (OUTPATIENT)
Dept: CT IMAGING | Age: 68
End: 2023-12-26
Payer: MEDICARE

## 2023-12-26 VITALS
DIASTOLIC BLOOD PRESSURE: 94 MMHG | WEIGHT: 230 LBS | SYSTOLIC BLOOD PRESSURE: 132 MMHG | BODY MASS INDEX: 34.07 KG/M2 | HEART RATE: 82 BPM | HEIGHT: 69 IN | TEMPERATURE: 98 F | OXYGEN SATURATION: 99 % | RESPIRATION RATE: 15 BRPM

## 2023-12-26 DIAGNOSIS — M25.462 EFFUSION OF LEFT KNEE: Primary | ICD-10-CM

## 2023-12-26 PROCEDURE — 73562 X-RAY EXAM OF KNEE 3: CPT

## 2023-12-26 PROCEDURE — 73700 CT LOWER EXTREMITY W/O DYE: CPT

## 2023-12-26 PROCEDURE — 99284 EMERGENCY DEPT VISIT MOD MDM: CPT

## 2023-12-26 PROCEDURE — 96372 THER/PROPH/DIAG INJ SC/IM: CPT

## 2023-12-26 PROCEDURE — 6360000002 HC RX W HCPCS: Performed by: EMERGENCY MEDICINE

## 2023-12-26 RX ORDER — MORPHINE SULFATE 4 MG/ML
4 INJECTION, SOLUTION INTRAMUSCULAR; INTRAVENOUS ONCE
Status: COMPLETED | OUTPATIENT
Start: 2023-12-26 | End: 2023-12-26

## 2023-12-26 RX ORDER — KETOROLAC TROMETHAMINE 30 MG/ML
30 INJECTION, SOLUTION INTRAMUSCULAR; INTRAVENOUS ONCE
Status: COMPLETED | OUTPATIENT
Start: 2023-12-26 | End: 2023-12-26

## 2023-12-26 RX ADMIN — KETOROLAC TROMETHAMINE 30 MG: 30 INJECTION INTRAMUSCULAR; INTRAVENOUS at 11:57

## 2023-12-26 RX ADMIN — MORPHINE SULFATE 4 MG: 4 INJECTION, SOLUTION INTRAMUSCULAR; INTRAVENOUS at 11:57

## 2023-12-26 NOTE — ED PROVIDER NOTES
EMERGENCY DEPARTMENT ENCOUNTER    Pt Name: Sukumar Hernandez  MRN: [id number]  [address] [facility] [place] 1955  Date of evaluation: 12/26/23  CHIEF COMPLAINT       Chief Complaint   Patient presents with    Leg Pain     Pain to left leg. Hx of dvt      HISTORY OF PRESENT ILLNESS   HPI   Patient is a 62-year-old who presents to the ED for left knee pain. Pain started 3 days ago. Pain described as burning constant pain. Also reports swelling to knee. No trauma or injuries reported. She is status post knee arthroplasties bilaterally. Previous history of DVTs. Not currently on anticoagulants. Reports history of rheumatoid arthritis, lupus. Reports joint swelling typically in hands or wrists and ankles. REVIEW OF SYSTEMS     Review of Systems  All other systems reviewed and are negative. PASTMEDICAL HISTORY     Past Medical History:   Diagnosis Date    Abnormal PFT     Allergic rhinitis due to allergen     with postnasal drip     Asthma     Moderate persistent asthma without complication (A96.90)    Chronic cough     Connective tissue disorder (HCC)     unspecified    Dyspnea     HTN (hypertension)     ILD (interstitial lung disease) (HCC)     LIP (lymphoid interstitial pneumonitis) (HCC)     Lung nodules     Nonspecific 3 mm     Lupus (HCC)     JOJO (obstructive sleep apnea)     Rheumatoid arthritis (HCC)      Past Problem List  There is no problem list on file for this patient.     SURGICAL HISTORY       Past Surgical History:   Procedure Laterality Date    CHOLECYSTECTOMY      KNEE SURGERY Left     WRIST SURGERY Right      CURRENT MEDICATIONS       Previous Medications    ACETAMINOPHEN (TYLENOL) 500 MG TABLET    Take 2 tablets by mouth every 8 hours as needed for Pain    ALBUTEROL SULFATE HFA (VENTOLIN HFA) 108 (90 BASE) MCG/ACT INHALER    Inhale 2 puffs into the lungs every 6 hours as needed for Wheezing or Shortness of Breath    AMLODIPINE (NORVASC) 10 MG TABLET    Take 10 mg by mouth daily    BARICITINIB

## 2023-12-26 NOTE — DISCHARGE INSTRUCTIONS
Rest, ice, compression, and elevation of left knee to help with swelling. Return to this emergency room immediately if your symptoms persist, worsen or if new ones form. Make sure you follow-up with Dr. Iván Morejon within the next 1-2 business days.

## 2024-02-14 ENCOUNTER — HOSPITAL ENCOUNTER (OUTPATIENT)
Dept: MAMMOGRAPHY | Age: 69
Discharge: HOME | End: 2024-02-16
Payer: MEDICARE

## 2024-02-14 DIAGNOSIS — Z12.31 ENCOUNTER FOR SCREENING MAMMOGRAM FOR BREAST CANCER: ICD-10-CM

## 2024-02-14 PROCEDURE — 77063 BREAST TOMOSYNTHESIS BI: CPT

## 2024-08-26 ENCOUNTER — OFFICE VISIT (OUTPATIENT)
Dept: PULMONOLOGY | Age: 69
End: 2024-08-26
Payer: MEDICARE

## 2024-08-26 VITALS
WEIGHT: 239.2 LBS | SYSTOLIC BLOOD PRESSURE: 133 MMHG | RESPIRATION RATE: 16 BRPM | HEIGHT: 69 IN | BODY MASS INDEX: 35.43 KG/M2 | OXYGEN SATURATION: 98 % | DIASTOLIC BLOOD PRESSURE: 82 MMHG | TEMPERATURE: 97 F | HEART RATE: 63 BPM

## 2024-08-26 DIAGNOSIS — J30.9 ALLERGIC RHINITIS, UNSPECIFIED SEASONALITY, UNSPECIFIED TRIGGER: ICD-10-CM

## 2024-08-26 DIAGNOSIS — J45.30 MILD PERSISTENT ASTHMA WITHOUT COMPLICATION: Primary | ICD-10-CM

## 2024-08-26 DIAGNOSIS — G47.33 OSA (OBSTRUCTIVE SLEEP APNEA): ICD-10-CM

## 2024-08-26 DIAGNOSIS — J84.2 LIP (LYMPHOID INTERSTITIAL PNEUMONITIS) (HCC): ICD-10-CM

## 2024-08-26 PROCEDURE — 1123F ACP DISCUSS/DSCN MKR DOCD: CPT | Performed by: INTERNAL MEDICINE

## 2024-08-26 PROCEDURE — 99214 OFFICE O/P EST MOD 30 MIN: CPT | Performed by: INTERNAL MEDICINE

## 2024-08-26 RX ORDER — ALBUTEROL SULFATE 90 UG/1
2 AEROSOL, METERED RESPIRATORY (INHALATION) EVERY 6 HOURS PRN
Qty: 1 EACH | Refills: 5 | Status: SHIPPED | OUTPATIENT
Start: 2024-08-26

## 2024-08-26 NOTE — PROGRESS NOTES
PULMONARY OUTPATIENT  PROGRESS NOTE    Telehealth visit.  [facility] virtual visit    Patient:  Ramonita Daniel  YOB: 1955    MRN: [id number]     Acct:        Pt seen and Chart reviewed.Mr/Ms Ramonita Daniel is here in followup for    Diagnosis Orders   1. Mild persistent asthma without complication        2. Allergic rhinitis, unspecified seasonality, unspecified trigger        3. LIP (lymphoid interstitial pneumonitis) ([facility])        4. JOJO (obstructive sleep apnea)          Today she is here for follow-up in the office.  She was seen last time in 2 years and she has not returned since then.  According patient she has been doing well.  She has not been using Advair.  According patient she usually get problem when she has dust exposure when it is hot and humid.  She is mostly using albuterol as needed and she use it once or twice in few weeks denies daily use of albuterol.  She does not complain of cough she does not complain of wheezing and she denies sputum production.  She denies nocturnal awakening with cough wheezing chest tightness or shortness of breath.  She has intermittent nasal congestion and postnasal drip she use Flonase and Zyrtec as needed and she is not using Flonase and Zyrtec on daily basis also.  She does not complain of shortness of breath she denies chest pain.    She had a history of obstructive sleep apnea she had not been using CPAP for long and many years.  She denies waking up at night with gasping snoring and choking.  According patient she does not take nap during the daytime.  Since she is retired she usually sleep until 830 she usually go to bed around 1030.    She has been followed by rheumatology and she was on Xeljanz before and according patient it was not working after some time and then it is changed to Rinvoq which she is taking at this time and followed by rheumatology.  She does not use prednisone.    Previous work-up in 2021  Last HR

## 2025-02-03 ENCOUNTER — HOSPITAL ENCOUNTER (OUTPATIENT)
Age: 70
Setting detail: SPECIMEN
Discharge: HOME | End: 2025-02-03
Payer: MEDICARE

## 2025-02-03 LAB
BACTERIA URNS QL MICRO: ABNORMAL
BILIRUB UR QL STRIP: NEGATIVE
CASTS #/AREA URNS LPF: ABNORMAL /LPF (ref 0–2)
CASTS #/AREA URNS LPF: ABNORMAL /LPF (ref 0–2)
CLARITY UR: CLEAR
COLOR UR: YELLOW
EPI CELLS #/AREA URNS HPF: ABNORMAL /HPF (ref 0–5)
GLUCOSE UR STRIP-MCNC: ABNORMAL MG/DL
HGB UR QL STRIP.AUTO: NEGATIVE
KETONES UR STRIP-MCNC: ABNORMAL MG/DL
LEUKOCYTE ESTERASE UR QL STRIP: NEGATIVE
NITRITE UR QL STRIP: NEGATIVE
PH UR STRIP: 6 [PH] (ref 5–8)
PROT UR STRIP-MCNC: ABNORMAL MG/DL
RBC #/AREA URNS HPF: ABNORMAL /HPF (ref 0–2)
SP GR UR STRIP: 1.02 (ref 1–1.03)
UROBILINOGEN UR STRIP-ACNC: NORMAL EU/DL (ref 0–1)
WBC #/AREA URNS HPF: ABNORMAL /HPF (ref 0–5)

## 2025-02-03 PROCEDURE — 81001 URINALYSIS AUTO W/SCOPE: CPT

## 2025-02-15 ENCOUNTER — HOSPITAL ENCOUNTER (OUTPATIENT)
Dept: MAMMOGRAPHY | Age: 70
Discharge: HOME | End: 2025-02-17
Payer: MEDICARE

## 2025-02-15 VITALS — BODY MASS INDEX: 35.4 KG/M2 | HEIGHT: 69 IN | WEIGHT: 239 LBS

## 2025-02-15 DIAGNOSIS — Z12.31 VISIT FOR SCREENING MAMMOGRAM: ICD-10-CM

## 2025-02-15 PROCEDURE — 77063 BREAST TOMOSYNTHESIS BI: CPT

## 2025-07-02 ENCOUNTER — HOSPITAL ENCOUNTER (OUTPATIENT)
Dept: GENERAL RADIOLOGY | Age: 70
Discharge: HOME | End: 2025-07-04
Payer: MEDICARE

## 2025-07-02 ENCOUNTER — OFFICE VISIT (OUTPATIENT)
Dept: PULMONOLOGY | Age: 70
End: 2025-07-02
Payer: MEDICARE

## 2025-07-02 VITALS
BODY MASS INDEX: 32.14 KG/M2 | OXYGEN SATURATION: 98 % | SYSTOLIC BLOOD PRESSURE: 120 MMHG | WEIGHT: 217 LBS | HEART RATE: 75 BPM | DIASTOLIC BLOOD PRESSURE: 80 MMHG | HEIGHT: 69 IN

## 2025-07-02 DIAGNOSIS — J30.9 ALLERGIC RHINITIS, UNSPECIFIED SEASONALITY, UNSPECIFIED TRIGGER: ICD-10-CM

## 2025-07-02 DIAGNOSIS — J45.30 MILD PERSISTENT ASTHMA WITHOUT COMPLICATION: Primary | ICD-10-CM

## 2025-07-02 DIAGNOSIS — J45.30 MILD PERSISTENT ASTHMA WITHOUT COMPLICATION: ICD-10-CM

## 2025-07-02 DIAGNOSIS — J84.2 LIP (LYMPHOID INTERSTITIAL PNEUMONITIS) (HCC): ICD-10-CM

## 2025-07-02 DIAGNOSIS — G47.33 OSA (OBSTRUCTIVE SLEEP APNEA): ICD-10-CM

## 2025-07-02 PROCEDURE — 1159F MED LIST DOCD IN RCRD: CPT | Performed by: INTERNAL MEDICINE

## 2025-07-02 PROCEDURE — 1123F ACP DISCUSS/DSCN MKR DOCD: CPT | Performed by: INTERNAL MEDICINE

## 2025-07-02 PROCEDURE — 71046 X-RAY EXAM CHEST 2 VIEWS: CPT

## 2025-07-02 PROCEDURE — 99214 OFFICE O/P EST MOD 30 MIN: CPT | Performed by: INTERNAL MEDICINE

## 2025-07-02 RX ORDER — LEFLUNOMIDE 10 MG/1
10 TABLET ORAL DAILY
COMMUNITY

## 2025-07-02 RX ORDER — TRAMADOL HYDROCHLORIDE 50 MG/1
50 TABLET ORAL EVERY 6 HOURS PRN
COMMUNITY

## 2025-07-02 RX ORDER — UPADACITINIB 15 MG/1
TABLET, EXTENDED RELEASE ORAL
COMMUNITY

## 2025-07-02 RX ORDER — CYCLOBENZAPRINE HCL 10 MG
10 TABLET ORAL 3 TIMES DAILY PRN
COMMUNITY

## 2025-07-02 RX ORDER — DAPAGLIFLOZIN 5 MG/1
5 TABLET, FILM COATED ORAL EVERY MORNING
COMMUNITY

## 2025-07-02 NOTE — PROGRESS NOTES
high-resolution CT of the chest since 2017 per radiology.  (Images reviewed from both CT scan).  Lung nodule has not changed either since 2017  Her pulmonary function test on 07/06/2021 is consistent with moderate restrictive ventilatory impairment her total lung capacity and FVC is only slightly reduced almost stable as compared to 2018 surprisingly her diffusion capacity was reported as normal although it had been moderately reduced previously.      Assessment & Plan  1. Mild persistent asthma.  Her lung function appears satisfactory at present.  Patient has been off Advair for almost 2 years now.  She is not having symptoms.  She use albuterol as needed occasionally especially with weather changes.  She has been advised to commence Advair therapy once daily for a duration of 2 weeks prior to her upcoming surgery on 07/25/2025. This regimen should be continued for an additional week post-surgery, after which she may discontinue its use and monitor off Advair. A chest x-ray has been ordered to ensure optimal respiratory health before the surgical procedure.    2. Allergic rhinitis.  She reports rare use of Zyrtec and Flonase, primarily during severe allergy episodes or environmental changes. No changes to her current management plan are necessary at this time.    3. Rheumatoid arthritis.  She is currently on Rinvoq and Arava, which seem to be managing her symptoms effectively. No changes to her current medication regimen are necessary at this time.    4. Type 2 diabetes mellitus.  She is on Rybelsus, glimepiride, Farxiga, and insulin injections. She reports ongoing weight loss, which may be attributed to her current medications. No changes to her current medication regimen are necessary at this time.    5. Left knee replacement revision.  She requires clearance for a total knee replacement revision surgery scheduled for 07/25/2025. Clearance will be provided.  She is low risk for general esthesia and surgery.